# Patient Record
Sex: FEMALE | Race: WHITE | NOT HISPANIC OR LATINO | URBAN - METROPOLITAN AREA
[De-identification: names, ages, dates, MRNs, and addresses within clinical notes are randomized per-mention and may not be internally consistent; named-entity substitution may affect disease eponyms.]

---

## 2017-06-14 ENCOUNTER — OUTPATIENT (OUTPATIENT)
Dept: OUTPATIENT SERVICES | Facility: HOSPITAL | Age: 82
LOS: 1 days | Discharge: HOME | End: 2017-06-14

## 2017-06-14 DIAGNOSIS — I63.9 CEREBRAL INFARCTION, UNSPECIFIED: ICD-10-CM

## 2017-06-28 ENCOUNTER — OUTPATIENT (OUTPATIENT)
Dept: OUTPATIENT SERVICES | Facility: HOSPITAL | Age: 82
LOS: 1 days | Discharge: HOME | End: 2017-06-28

## 2017-06-28 DIAGNOSIS — I63.9 CEREBRAL INFARCTION, UNSPECIFIED: ICD-10-CM

## 2017-06-28 DIAGNOSIS — D68.9 COAGULATION DEFECT, UNSPECIFIED: ICD-10-CM

## 2017-07-12 ENCOUNTER — OUTPATIENT (OUTPATIENT)
Dept: OUTPATIENT SERVICES | Facility: HOSPITAL | Age: 82
LOS: 1 days | Discharge: HOME | End: 2017-07-12

## 2017-07-12 DIAGNOSIS — I63.9 CEREBRAL INFARCTION, UNSPECIFIED: ICD-10-CM

## 2017-07-12 DIAGNOSIS — D68.9 COAGULATION DEFECT, UNSPECIFIED: ICD-10-CM

## 2017-07-25 ENCOUNTER — OUTPATIENT (OUTPATIENT)
Dept: OUTPATIENT SERVICES | Facility: HOSPITAL | Age: 82
LOS: 1 days | Discharge: HOME | End: 2017-07-25

## 2017-07-25 DIAGNOSIS — I63.9 CEREBRAL INFARCTION, UNSPECIFIED: ICD-10-CM

## 2017-07-25 DIAGNOSIS — I48.2 CHRONIC ATRIAL FIBRILLATION: ICD-10-CM

## 2017-08-08 ENCOUNTER — OUTPATIENT (OUTPATIENT)
Dept: OUTPATIENT SERVICES | Facility: HOSPITAL | Age: 82
LOS: 1 days | Discharge: HOME | End: 2017-08-08

## 2017-08-08 DIAGNOSIS — I63.9 CEREBRAL INFARCTION, UNSPECIFIED: ICD-10-CM

## 2017-08-08 DIAGNOSIS — I48.2 CHRONIC ATRIAL FIBRILLATION: ICD-10-CM

## 2017-08-22 ENCOUNTER — OUTPATIENT (OUTPATIENT)
Dept: OUTPATIENT SERVICES | Facility: HOSPITAL | Age: 82
LOS: 1 days | Discharge: HOME | End: 2017-08-22

## 2017-08-22 DIAGNOSIS — I63.9 CEREBRAL INFARCTION, UNSPECIFIED: ICD-10-CM

## 2017-08-22 DIAGNOSIS — I48.2 CHRONIC ATRIAL FIBRILLATION: ICD-10-CM

## 2017-09-05 ENCOUNTER — OUTPATIENT (OUTPATIENT)
Dept: OUTPATIENT SERVICES | Facility: HOSPITAL | Age: 82
LOS: 1 days | Discharge: HOME | End: 2017-09-05

## 2017-09-05 DIAGNOSIS — I63.9 CEREBRAL INFARCTION, UNSPECIFIED: ICD-10-CM

## 2017-09-05 DIAGNOSIS — I48.2 CHRONIC ATRIAL FIBRILLATION: ICD-10-CM

## 2017-09-19 ENCOUNTER — OUTPATIENT (OUTPATIENT)
Dept: OUTPATIENT SERVICES | Facility: HOSPITAL | Age: 82
LOS: 1 days | Discharge: HOME | End: 2017-09-19

## 2017-09-19 DIAGNOSIS — I63.9 CEREBRAL INFARCTION, UNSPECIFIED: ICD-10-CM

## 2017-09-19 DIAGNOSIS — D68.9 COAGULATION DEFECT, UNSPECIFIED: ICD-10-CM

## 2017-10-03 ENCOUNTER — OUTPATIENT (OUTPATIENT)
Dept: OUTPATIENT SERVICES | Facility: HOSPITAL | Age: 82
LOS: 1 days | Discharge: HOME | End: 2017-10-03

## 2017-10-03 DIAGNOSIS — D68.9 COAGULATION DEFECT, UNSPECIFIED: ICD-10-CM

## 2017-10-03 DIAGNOSIS — I63.9 CEREBRAL INFARCTION, UNSPECIFIED: ICD-10-CM

## 2017-10-17 ENCOUNTER — OUTPATIENT (OUTPATIENT)
Dept: OUTPATIENT SERVICES | Facility: HOSPITAL | Age: 82
LOS: 1 days | Discharge: HOME | End: 2017-10-17

## 2017-10-17 DIAGNOSIS — D68.9 COAGULATION DEFECT, UNSPECIFIED: ICD-10-CM

## 2017-10-17 DIAGNOSIS — I63.9 CEREBRAL INFARCTION, UNSPECIFIED: ICD-10-CM

## 2017-10-31 ENCOUNTER — OUTPATIENT (OUTPATIENT)
Dept: OUTPATIENT SERVICES | Facility: HOSPITAL | Age: 82
LOS: 1 days | Discharge: HOME | End: 2017-10-31

## 2017-10-31 DIAGNOSIS — D68.9 COAGULATION DEFECT, UNSPECIFIED: ICD-10-CM

## 2017-10-31 DIAGNOSIS — I63.9 CEREBRAL INFARCTION, UNSPECIFIED: ICD-10-CM

## 2017-11-14 ENCOUNTER — OUTPATIENT (OUTPATIENT)
Dept: OUTPATIENT SERVICES | Facility: HOSPITAL | Age: 82
LOS: 1 days | Discharge: HOME | End: 2017-11-14

## 2017-11-14 DIAGNOSIS — I63.9 CEREBRAL INFARCTION, UNSPECIFIED: ICD-10-CM

## 2017-11-14 DIAGNOSIS — D68.9 COAGULATION DEFECT, UNSPECIFIED: ICD-10-CM

## 2017-11-28 ENCOUNTER — OUTPATIENT (OUTPATIENT)
Dept: OUTPATIENT SERVICES | Facility: HOSPITAL | Age: 82
LOS: 1 days | Discharge: HOME | End: 2017-11-28

## 2017-11-28 DIAGNOSIS — I63.9 CEREBRAL INFARCTION, UNSPECIFIED: ICD-10-CM

## 2017-11-28 DIAGNOSIS — D68.9 COAGULATION DEFECT, UNSPECIFIED: ICD-10-CM

## 2017-12-12 ENCOUNTER — OUTPATIENT (OUTPATIENT)
Dept: OUTPATIENT SERVICES | Facility: HOSPITAL | Age: 82
LOS: 1 days | Discharge: HOME | End: 2017-12-12

## 2017-12-12 DIAGNOSIS — D68.9 COAGULATION DEFECT, UNSPECIFIED: ICD-10-CM

## 2017-12-12 DIAGNOSIS — I63.9 CEREBRAL INFARCTION, UNSPECIFIED: ICD-10-CM

## 2017-12-17 ENCOUNTER — EMERGENCY (EMERGENCY)
Facility: HOSPITAL | Age: 82
LOS: 0 days | Discharge: HOME | End: 2017-12-17
Admitting: INTERNAL MEDICINE

## 2017-12-17 DIAGNOSIS — N39.0 URINARY TRACT INFECTION, SITE NOT SPECIFIED: ICD-10-CM

## 2017-12-17 DIAGNOSIS — I48.91 UNSPECIFIED ATRIAL FIBRILLATION: ICD-10-CM

## 2017-12-17 DIAGNOSIS — I63.9 CEREBRAL INFARCTION, UNSPECIFIED: ICD-10-CM

## 2017-12-17 DIAGNOSIS — I10 ESSENTIAL (PRIMARY) HYPERTENSION: ICD-10-CM

## 2017-12-17 DIAGNOSIS — R53.1 WEAKNESS: ICD-10-CM

## 2017-12-17 DIAGNOSIS — Z79.899 OTHER LONG TERM (CURRENT) DRUG THERAPY: ICD-10-CM

## 2017-12-17 DIAGNOSIS — M25.552 PAIN IN LEFT HIP: ICD-10-CM

## 2017-12-27 ENCOUNTER — OUTPATIENT (OUTPATIENT)
Dept: OUTPATIENT SERVICES | Facility: HOSPITAL | Age: 82
LOS: 1 days | Discharge: HOME | End: 2017-12-27

## 2017-12-27 DIAGNOSIS — D68.9 COAGULATION DEFECT, UNSPECIFIED: ICD-10-CM

## 2017-12-27 DIAGNOSIS — I63.9 CEREBRAL INFARCTION, UNSPECIFIED: ICD-10-CM

## 2018-01-10 ENCOUNTER — OUTPATIENT (OUTPATIENT)
Dept: OUTPATIENT SERVICES | Facility: HOSPITAL | Age: 83
LOS: 1 days | Discharge: HOME | End: 2018-01-10

## 2018-01-10 DIAGNOSIS — D68.9 COAGULATION DEFECT, UNSPECIFIED: ICD-10-CM

## 2018-01-10 DIAGNOSIS — I63.9 CEREBRAL INFARCTION, UNSPECIFIED: ICD-10-CM

## 2018-01-24 ENCOUNTER — OUTPATIENT (OUTPATIENT)
Dept: OUTPATIENT SERVICES | Facility: HOSPITAL | Age: 83
LOS: 1 days | Discharge: HOME | End: 2018-01-24

## 2018-01-24 DIAGNOSIS — D68.9 COAGULATION DEFECT, UNSPECIFIED: ICD-10-CM

## 2018-02-04 DIAGNOSIS — I63.9 CEREBRAL INFARCTION, UNSPECIFIED: ICD-10-CM

## 2018-02-06 ENCOUNTER — OUTPATIENT (OUTPATIENT)
Dept: OUTPATIENT SERVICES | Facility: HOSPITAL | Age: 83
LOS: 1 days | Discharge: HOME | End: 2018-02-06

## 2018-02-06 DIAGNOSIS — D68.9 COAGULATION DEFECT, UNSPECIFIED: ICD-10-CM

## 2018-02-20 ENCOUNTER — OUTPATIENT (OUTPATIENT)
Dept: OUTPATIENT SERVICES | Facility: HOSPITAL | Age: 83
LOS: 1 days | Discharge: HOME | End: 2018-02-20

## 2018-02-20 DIAGNOSIS — D68.9 COAGULATION DEFECT, UNSPECIFIED: ICD-10-CM

## 2018-03-06 ENCOUNTER — OUTPATIENT (OUTPATIENT)
Dept: OUTPATIENT SERVICES | Facility: HOSPITAL | Age: 83
LOS: 1 days | Discharge: HOME | End: 2018-03-06

## 2018-03-06 DIAGNOSIS — D68.9 COAGULATION DEFECT, UNSPECIFIED: ICD-10-CM

## 2018-03-20 ENCOUNTER — OUTPATIENT (OUTPATIENT)
Dept: OUTPATIENT SERVICES | Facility: HOSPITAL | Age: 83
LOS: 1 days | Discharge: HOME | End: 2018-03-20

## 2018-03-20 DIAGNOSIS — D68.9 COAGULATION DEFECT, UNSPECIFIED: ICD-10-CM

## 2018-04-03 ENCOUNTER — OUTPATIENT (OUTPATIENT)
Dept: OUTPATIENT SERVICES | Facility: HOSPITAL | Age: 83
LOS: 1 days | Discharge: HOME | End: 2018-04-03

## 2018-04-03 DIAGNOSIS — D68.9 COAGULATION DEFECT, UNSPECIFIED: ICD-10-CM

## 2018-04-18 ENCOUNTER — OUTPATIENT (OUTPATIENT)
Dept: OUTPATIENT SERVICES | Facility: HOSPITAL | Age: 83
LOS: 1 days | Discharge: HOME | End: 2018-04-18

## 2018-04-18 DIAGNOSIS — D68.9 COAGULATION DEFECT, UNSPECIFIED: ICD-10-CM

## 2018-05-02 ENCOUNTER — OUTPATIENT (OUTPATIENT)
Dept: OUTPATIENT SERVICES | Facility: HOSPITAL | Age: 83
LOS: 1 days | Discharge: HOME | End: 2018-05-02

## 2018-05-02 DIAGNOSIS — D68.9 COAGULATION DEFECT, UNSPECIFIED: ICD-10-CM

## 2018-05-16 ENCOUNTER — OUTPATIENT (OUTPATIENT)
Dept: OUTPATIENT SERVICES | Facility: HOSPITAL | Age: 83
LOS: 1 days | Discharge: HOME | End: 2018-05-16

## 2018-05-16 DIAGNOSIS — D68.9 COAGULATION DEFECT, UNSPECIFIED: ICD-10-CM

## 2018-05-30 ENCOUNTER — OUTPATIENT (OUTPATIENT)
Dept: OUTPATIENT SERVICES | Facility: HOSPITAL | Age: 83
LOS: 1 days | Discharge: HOME | End: 2018-05-30

## 2018-05-30 DIAGNOSIS — D68.9 COAGULATION DEFECT, UNSPECIFIED: ICD-10-CM

## 2018-06-13 ENCOUNTER — OUTPATIENT (OUTPATIENT)
Dept: OUTPATIENT SERVICES | Facility: HOSPITAL | Age: 83
LOS: 1 days | Discharge: HOME | End: 2018-06-13

## 2018-06-13 DIAGNOSIS — D68.9 COAGULATION DEFECT, UNSPECIFIED: ICD-10-CM

## 2018-06-27 ENCOUNTER — OUTPATIENT (OUTPATIENT)
Dept: OUTPATIENT SERVICES | Facility: HOSPITAL | Age: 83
LOS: 1 days | Discharge: HOME | End: 2018-06-27

## 2018-06-27 DIAGNOSIS — D68.9 COAGULATION DEFECT, UNSPECIFIED: ICD-10-CM

## 2018-07-11 ENCOUNTER — OUTPATIENT (OUTPATIENT)
Dept: OUTPATIENT SERVICES | Facility: HOSPITAL | Age: 83
LOS: 1 days | Discharge: HOME | End: 2018-07-11

## 2018-07-11 DIAGNOSIS — D68.9 COAGULATION DEFECT, UNSPECIFIED: ICD-10-CM

## 2018-07-25 ENCOUNTER — OUTPATIENT (OUTPATIENT)
Dept: OUTPATIENT SERVICES | Facility: HOSPITAL | Age: 83
LOS: 1 days | Discharge: HOME | End: 2018-07-25

## 2018-07-25 DIAGNOSIS — D68.9 COAGULATION DEFECT, UNSPECIFIED: ICD-10-CM

## 2018-08-15 ENCOUNTER — OUTPATIENT (OUTPATIENT)
Dept: OUTPATIENT SERVICES | Facility: HOSPITAL | Age: 83
LOS: 1 days | Discharge: HOME | End: 2018-08-15

## 2018-08-15 DIAGNOSIS — D68.9 COAGULATION DEFECT, UNSPECIFIED: ICD-10-CM

## 2018-09-05 ENCOUNTER — OUTPATIENT (OUTPATIENT)
Dept: OUTPATIENT SERVICES | Facility: HOSPITAL | Age: 83
LOS: 1 days | Discharge: HOME | End: 2018-09-05

## 2018-09-05 DIAGNOSIS — D68.9 COAGULATION DEFECT, UNSPECIFIED: ICD-10-CM

## 2018-09-14 ENCOUNTER — EMERGENCY (EMERGENCY)
Facility: HOSPITAL | Age: 83
LOS: 0 days | Discharge: LEFT BEFORE DISCHARGE | End: 2018-09-14
Attending: EMERGENCY MEDICINE | Admitting: EMERGENCY MEDICINE

## 2018-09-14 VITALS
SYSTOLIC BLOOD PRESSURE: 122 MMHG | HEART RATE: 90 BPM | OXYGEN SATURATION: 96 % | DIASTOLIC BLOOD PRESSURE: 56 MMHG | RESPIRATION RATE: 18 BRPM | TEMPERATURE: 98 F

## 2018-09-14 DIAGNOSIS — R07.89 OTHER CHEST PAIN: ICD-10-CM

## 2018-09-14 DIAGNOSIS — R55 SYNCOPE AND COLLAPSE: ICD-10-CM

## 2018-09-14 DIAGNOSIS — Z79.01 LONG TERM (CURRENT) USE OF ANTICOAGULANTS: ICD-10-CM

## 2018-09-14 DIAGNOSIS — E78.5 HYPERLIPIDEMIA, UNSPECIFIED: ICD-10-CM

## 2018-09-14 DIAGNOSIS — I10 ESSENTIAL (PRIMARY) HYPERTENSION: ICD-10-CM

## 2018-09-14 LAB
ALBUMIN SERPL ELPH-MCNC: 3.9 G/DL — SIGNIFICANT CHANGE UP (ref 3.5–5.2)
ALP SERPL-CCNC: 160 U/L — HIGH (ref 30–115)
ALT FLD-CCNC: 11 U/L — SIGNIFICANT CHANGE UP (ref 0–41)
ANION GAP SERPL CALC-SCNC: 15 MMOL/L — HIGH (ref 7–14)
APPEARANCE UR: CLEAR — SIGNIFICANT CHANGE UP
AST SERPL-CCNC: 30 U/L — SIGNIFICANT CHANGE UP (ref 0–41)
BASOPHILS # BLD AUTO: 0.02 K/UL — SIGNIFICANT CHANGE UP (ref 0–0.2)
BASOPHILS NFR BLD AUTO: 0.2 % — SIGNIFICANT CHANGE UP (ref 0–1)
BILIRUB SERPL-MCNC: 0.7 MG/DL — SIGNIFICANT CHANGE UP (ref 0.2–1.2)
BILIRUB UR-MCNC: NEGATIVE — SIGNIFICANT CHANGE UP
BUN SERPL-MCNC: 20 MG/DL — SIGNIFICANT CHANGE UP (ref 10–20)
CALCIUM SERPL-MCNC: 9.9 MG/DL — SIGNIFICANT CHANGE UP (ref 8.5–10.1)
CHLORIDE SERPL-SCNC: 104 MMOL/L — SIGNIFICANT CHANGE UP (ref 98–110)
CK SERPL-CCNC: 93 U/L — SIGNIFICANT CHANGE UP (ref 0–225)
CO2 SERPL-SCNC: 24 MMOL/L — SIGNIFICANT CHANGE UP (ref 17–32)
COLOR SPEC: YELLOW — SIGNIFICANT CHANGE UP
CREAT SERPL-MCNC: 1.1 MG/DL — SIGNIFICANT CHANGE UP (ref 0.7–1.5)
DIFF PNL FLD: ABNORMAL
EOSINOPHIL # BLD AUTO: 0 K/UL — SIGNIFICANT CHANGE UP (ref 0–0.7)
EOSINOPHIL NFR BLD AUTO: 0 % — SIGNIFICANT CHANGE UP (ref 0–8)
GLUCOSE SERPL-MCNC: 112 MG/DL — HIGH (ref 70–99)
GLUCOSE UR QL: NEGATIVE MG/DL — SIGNIFICANT CHANGE UP
HCT VFR BLD CALC: 41.7 % — SIGNIFICANT CHANGE UP (ref 37–47)
HGB BLD-MCNC: 14 G/DL — SIGNIFICANT CHANGE UP (ref 12–16)
IMM GRANULOCYTES NFR BLD AUTO: 0.3 % — SIGNIFICANT CHANGE UP (ref 0.1–0.3)
KETONES UR-MCNC: NEGATIVE — SIGNIFICANT CHANGE UP
LACTATE SERPL-SCNC: 1.9 MMOL/L — SIGNIFICANT CHANGE UP (ref 0.5–2.2)
LEUKOCYTE ESTERASE UR-ACNC: NEGATIVE — SIGNIFICANT CHANGE UP
LYMPHOCYTES # BLD AUTO: 0.83 K/UL — LOW (ref 1.2–3.4)
LYMPHOCYTES # BLD AUTO: 7 % — LOW (ref 20.5–51.1)
MCHC RBC-ENTMCNC: 33.6 G/DL — SIGNIFICANT CHANGE UP (ref 32–37)
MCHC RBC-ENTMCNC: 34.4 PG — HIGH (ref 27–31)
MCV RBC AUTO: 102.5 FL — HIGH (ref 81–99)
MONOCYTES # BLD AUTO: 0.5 K/UL — SIGNIFICANT CHANGE UP (ref 0.1–0.6)
MONOCYTES NFR BLD AUTO: 4.2 % — SIGNIFICANT CHANGE UP (ref 1.7–9.3)
NEUTROPHILS # BLD AUTO: 10.45 K/UL — HIGH (ref 1.4–6.5)
NEUTROPHILS NFR BLD AUTO: 88.3 % — HIGH (ref 42.2–75.2)
NITRITE UR-MCNC: NEGATIVE — SIGNIFICANT CHANGE UP
NRBC # BLD: 0 /100 WBCS — SIGNIFICANT CHANGE UP (ref 0–0)
PH UR: 6 — SIGNIFICANT CHANGE UP (ref 5–8)
PLATELET # BLD AUTO: 265 K/UL — SIGNIFICANT CHANGE UP (ref 130–400)
POTASSIUM SERPL-MCNC: 5.4 MMOL/L — HIGH (ref 3.5–5)
POTASSIUM SERPL-SCNC: 5.4 MMOL/L — HIGH (ref 3.5–5)
PROT SERPL-MCNC: 7.8 G/DL — SIGNIFICANT CHANGE UP (ref 6–8)
PROT UR-MCNC: NEGATIVE MG/DL — SIGNIFICANT CHANGE UP
RBC # BLD: 4.07 M/UL — LOW (ref 4.2–5.4)
RBC # FLD: 12.5 % — SIGNIFICANT CHANGE UP (ref 11.5–14.5)
RBC CASTS # UR COMP ASSIST: SIGNIFICANT CHANGE UP /HPF
SODIUM SERPL-SCNC: 143 MMOL/L — SIGNIFICANT CHANGE UP (ref 135–146)
SP GR SPEC: 1.01 — SIGNIFICANT CHANGE UP (ref 1.01–1.03)
TROPONIN T SERPL-MCNC: <0.01 NG/ML — SIGNIFICANT CHANGE UP
UROBILINOGEN FLD QL: 0.2 MG/DL — SIGNIFICANT CHANGE UP (ref 0.2–0.2)
WBC # BLD: 11.83 K/UL — HIGH (ref 4.8–10.8)
WBC # FLD AUTO: 11.83 K/UL — HIGH (ref 4.8–10.8)

## 2018-09-14 NOTE — ED PROVIDER NOTE - REFUSAL OF SERVICE, MDM
Discussed with health care proxy, who states that patient has had these episodes before and would like to take her home. Had lengthy discussion regarding our recommendation to admit her for syncope work up. She would still like to take the patient home and will sign her out AMA. Will give a copy of all her results so that patient follows up with PMD Dr. Nguyen. Discussed with health care proxy, Nedra Nguyen, who states that patient has had these episodes before and would like to take her home. Had lengthy discussion regarding our recommendation to admit her for syncope work up. She would still like to take the patient home and will sign her out AMA. Will give a copy of all her results so that patient follows up with PMD Dr. Nguyen.

## 2018-09-14 NOTE — ED PROVIDER NOTE - OBJECTIVE STATEMENT
87 yo F with hx of CVA with left sided deficit, HTN, HLD, dementia, afib on coumadin, presents for evaluation of witnessed syncopal episode, onset this morning, associated with chest tightness. No fever, no chills, no headache, no nausea, no vomiting, no diarrhea, no post ictal, no bowel or bladder incontinence, no back pain. Per aide, she was dressing patient, when patient slump over for a few seconds. At baseline patient is alert and oriented to person only, which she is at this time. Patient walks with walker at home. NO head trauma. No other symptoms reported

## 2018-09-14 NOTE — ED PROVIDER NOTE - PROGRESS NOTE DETAILS
ATTENDING NOTE: I personally evaluated the patient. I reviewed the Resident’s note (as assigned above), and agree with the findings and plan except as documented in my note.   85 y/o F with PMH of dementia, arthritis, stroke and a fib,  pt history is limited so history is given by aid. Today aid was trying to help pt move from the bathroom to the dinning table when pt started saying “I can’t” then proceed to have LOC and fall onto aid who help sit pt down on the toilet. Aid called 911 immediately. No head trauma. No fall onto floor. As per aid pt is currently has baseline in ED. No recent fevers. Physical exam: Pt non-toxic, well appearing. AAOx1, GCS 14, following simple commands, Pink conjunctiva, anicteric. PERRL. MMM, tongue midline  no exudates. Neck supple, no crepitus. RRR, no murmur. Lungs CTAB. Abdomen soft, NT/ND, no rash. No calf tenderness or edema. Moving all extremities.  No focal neuro deficits. Plan: Will get labs, imaging and reassess pending completion of w/u.  family requesting dc home. aware of risks. Discussed with health care proxy, who states that patient has had these episodes before and would like to take her home. Had lengthy discussion regarding our recommendation to admit her for syncope work up. She would still like to take the patient home and will sign her out AMA. Will give a copy of all her results so that patient follows up with PMD Dr. Nguyen. Discussed with health care proxy, Nedra Nguyen, who states that patient has had these episodes before and would like to take her home. Had lengthy discussion regarding our recommendation to admit her for syncope work up. She would still like to take the patient home and will sign her out AMA. Will give a copy of all her results so that patient follows up with PMD Dr. Nguyen.

## 2018-09-14 NOTE — ED PROVIDER NOTE - NEUROLOGICAL, MLM
Alert and oriented to person, CN II-XII intact, 4/5 R UE and LE strength, 3/5 L UE and LE strength, no sensory deficits.

## 2018-09-14 NOTE — ED PROVIDER NOTE - NEURO NEGATIVE STATEMENT, MLM
+ loss of consciousness, + syncope, no gait abnormality, no headache, no sensory deficits, and no weakness.

## 2018-09-14 NOTE — ED ADULT NURSE NOTE - NSIMPLEMENTINTERV_GEN_ALL_ED
Implemented All Fall Risk Interventions:  [place] to call system. Call bell, personal items and telephone within reach. Instruct patient to call for assistance. Room bathroom lighting operational. Non-slip footwear when patient is off stretcher. Physically safe environment: no spills, clutter or unnecessary equipment. Stretcher in lowest position, wheels locked, appropriate side rails in place. Provide visual cue, wrist band, yellow gown, etc. Monitor gait and stability. Monitor for mental status changes and reorient to person, place, and time. Review medications for side effects contributing to fall risk. Reinforce activity limits and safety measures with patient and family.

## 2018-09-16 LAB
CULTURE RESULTS: NO GROWTH — SIGNIFICANT CHANGE UP
SPECIMEN SOURCE: SIGNIFICANT CHANGE UP

## 2018-09-25 ENCOUNTER — OUTPATIENT (OUTPATIENT)
Dept: OUTPATIENT SERVICES | Facility: HOSPITAL | Age: 83
LOS: 1 days | Discharge: HOME | End: 2018-09-25

## 2018-09-25 DIAGNOSIS — D68.9 COAGULATION DEFECT, UNSPECIFIED: ICD-10-CM

## 2018-09-25 PROBLEM — I48.91 UNSPECIFIED ATRIAL FIBRILLATION: Chronic | Status: ACTIVE | Noted: 2018-09-14

## 2018-10-17 ENCOUNTER — OUTPATIENT (OUTPATIENT)
Dept: OUTPATIENT SERVICES | Facility: HOSPITAL | Age: 83
LOS: 1 days | Discharge: HOME | End: 2018-10-17

## 2018-10-17 DIAGNOSIS — D68.9 COAGULATION DEFECT, UNSPECIFIED: ICD-10-CM

## 2018-11-06 ENCOUNTER — OUTPATIENT (OUTPATIENT)
Dept: OUTPATIENT SERVICES | Facility: HOSPITAL | Age: 83
LOS: 1 days | Discharge: HOME | End: 2018-11-06

## 2018-11-06 DIAGNOSIS — D68.9 COAGULATION DEFECT, UNSPECIFIED: ICD-10-CM

## 2018-11-27 ENCOUNTER — OUTPATIENT (OUTPATIENT)
Dept: OUTPATIENT SERVICES | Facility: HOSPITAL | Age: 83
LOS: 1 days | Discharge: HOME | End: 2018-11-27

## 2018-11-27 DIAGNOSIS — D68.9 COAGULATION DEFECT, UNSPECIFIED: ICD-10-CM

## 2018-12-12 ENCOUNTER — OUTPATIENT (OUTPATIENT)
Dept: OUTPATIENT SERVICES | Facility: HOSPITAL | Age: 83
LOS: 1 days | Discharge: HOME | End: 2018-12-12

## 2018-12-12 DIAGNOSIS — D68.9 COAGULATION DEFECT, UNSPECIFIED: ICD-10-CM

## 2018-12-19 ENCOUNTER — OUTPATIENT (OUTPATIENT)
Dept: OUTPATIENT SERVICES | Facility: HOSPITAL | Age: 83
LOS: 1 days | Discharge: HOME | End: 2018-12-19

## 2018-12-19 DIAGNOSIS — Z02.9 ENCOUNTER FOR ADMINISTRATIVE EXAMINATIONS, UNSPECIFIED: ICD-10-CM

## 2019-01-09 ENCOUNTER — OUTPATIENT (OUTPATIENT)
Dept: OUTPATIENT SERVICES | Facility: HOSPITAL | Age: 84
LOS: 1 days | Discharge: HOME | End: 2019-01-09

## 2019-01-09 DIAGNOSIS — D68.9 COAGULATION DEFECT, UNSPECIFIED: ICD-10-CM

## 2019-01-30 ENCOUNTER — OUTPATIENT (OUTPATIENT)
Dept: OUTPATIENT SERVICES | Facility: HOSPITAL | Age: 84
LOS: 1 days | Discharge: HOME | End: 2019-01-30

## 2019-01-30 DIAGNOSIS — D68.9 COAGULATION DEFECT, UNSPECIFIED: ICD-10-CM

## 2019-02-13 ENCOUNTER — OUTPATIENT (OUTPATIENT)
Dept: OUTPATIENT SERVICES | Facility: HOSPITAL | Age: 84
LOS: 1 days | Discharge: HOME | End: 2019-02-13

## 2019-02-13 DIAGNOSIS — D68.9 COAGULATION DEFECT, UNSPECIFIED: ICD-10-CM

## 2019-03-01 ENCOUNTER — OUTPATIENT (OUTPATIENT)
Dept: OUTPATIENT SERVICES | Facility: HOSPITAL | Age: 84
LOS: 1 days | Discharge: HOME | End: 2019-03-01

## 2019-03-01 DIAGNOSIS — D68.9 COAGULATION DEFECT, UNSPECIFIED: ICD-10-CM

## 2019-03-12 ENCOUNTER — OUTPATIENT (OUTPATIENT)
Dept: OUTPATIENT SERVICES | Facility: HOSPITAL | Age: 84
LOS: 1 days | Discharge: HOME | End: 2019-03-12

## 2019-03-12 DIAGNOSIS — D68.9 COAGULATION DEFECT, UNSPECIFIED: ICD-10-CM

## 2019-03-27 ENCOUNTER — OUTPATIENT (OUTPATIENT)
Dept: OUTPATIENT SERVICES | Facility: HOSPITAL | Age: 84
LOS: 1 days | Discharge: HOME | End: 2019-03-27

## 2019-03-27 DIAGNOSIS — D68.9 COAGULATION DEFECT, UNSPECIFIED: ICD-10-CM

## 2019-04-09 ENCOUNTER — OUTPATIENT (OUTPATIENT)
Dept: OUTPATIENT SERVICES | Facility: HOSPITAL | Age: 84
LOS: 1 days | Discharge: HOME | End: 2019-04-09

## 2019-04-09 DIAGNOSIS — D68.9 COAGULATION DEFECT, UNSPECIFIED: ICD-10-CM

## 2019-04-23 ENCOUNTER — OUTPATIENT (OUTPATIENT)
Dept: OUTPATIENT SERVICES | Facility: HOSPITAL | Age: 84
LOS: 1 days | Discharge: HOME | End: 2019-04-23

## 2019-04-23 DIAGNOSIS — D68.9 COAGULATION DEFECT, UNSPECIFIED: ICD-10-CM

## 2019-05-07 ENCOUNTER — OUTPATIENT (OUTPATIENT)
Dept: OUTPATIENT SERVICES | Facility: HOSPITAL | Age: 84
LOS: 1 days | Discharge: HOME | End: 2019-05-07

## 2019-05-07 DIAGNOSIS — D68.9 COAGULATION DEFECT, UNSPECIFIED: ICD-10-CM

## 2019-05-21 ENCOUNTER — OUTPATIENT (OUTPATIENT)
Dept: OUTPATIENT SERVICES | Facility: HOSPITAL | Age: 84
LOS: 1 days | Discharge: HOME | End: 2019-05-21

## 2019-05-21 DIAGNOSIS — D68.9 COAGULATION DEFECT, UNSPECIFIED: ICD-10-CM

## 2019-05-22 ENCOUNTER — OUTPATIENT (OUTPATIENT)
Dept: OUTPATIENT SERVICES | Facility: HOSPITAL | Age: 84
LOS: 1 days | Discharge: HOME | End: 2019-05-22

## 2019-05-22 DIAGNOSIS — I48.91 UNSPECIFIED ATRIAL FIBRILLATION: ICD-10-CM

## 2019-06-04 ENCOUNTER — OUTPATIENT (OUTPATIENT)
Dept: OUTPATIENT SERVICES | Facility: HOSPITAL | Age: 84
LOS: 1 days | Discharge: HOME | End: 2019-06-04

## 2019-06-04 DIAGNOSIS — I48.91 UNSPECIFIED ATRIAL FIBRILLATION: ICD-10-CM

## 2019-06-18 ENCOUNTER — OUTPATIENT (OUTPATIENT)
Dept: OUTPATIENT SERVICES | Facility: HOSPITAL | Age: 84
LOS: 1 days | Discharge: HOME | End: 2019-06-18

## 2019-06-18 DIAGNOSIS — I48.91 UNSPECIFIED ATRIAL FIBRILLATION: ICD-10-CM

## 2019-06-19 ENCOUNTER — OUTPATIENT (OUTPATIENT)
Dept: OUTPATIENT SERVICES | Facility: HOSPITAL | Age: 84
LOS: 1 days | Discharge: HOME | End: 2019-06-19

## 2019-06-19 DIAGNOSIS — Z02.9 ENCOUNTER FOR ADMINISTRATIVE EXAMINATIONS, UNSPECIFIED: ICD-10-CM

## 2019-07-02 ENCOUNTER — OUTPATIENT (OUTPATIENT)
Dept: OUTPATIENT SERVICES | Facility: HOSPITAL | Age: 84
LOS: 1 days | Discharge: HOME | End: 2019-07-02

## 2019-07-02 DIAGNOSIS — I48.91 UNSPECIFIED ATRIAL FIBRILLATION: ICD-10-CM

## 2019-07-17 ENCOUNTER — OUTPATIENT (OUTPATIENT)
Dept: OUTPATIENT SERVICES | Facility: HOSPITAL | Age: 84
LOS: 1 days | Discharge: HOME | End: 2019-07-17

## 2019-07-17 DIAGNOSIS — I48.91 UNSPECIFIED ATRIAL FIBRILLATION: ICD-10-CM

## 2019-07-30 ENCOUNTER — OUTPATIENT (OUTPATIENT)
Dept: OUTPATIENT SERVICES | Facility: HOSPITAL | Age: 84
LOS: 1 days | Discharge: HOME | End: 2019-07-30

## 2019-07-30 DIAGNOSIS — I48.91 UNSPECIFIED ATRIAL FIBRILLATION: ICD-10-CM

## 2019-08-20 ENCOUNTER — OUTPATIENT (OUTPATIENT)
Dept: OUTPATIENT SERVICES | Facility: HOSPITAL | Age: 84
LOS: 1 days | Discharge: HOME | End: 2019-08-20

## 2019-08-20 DIAGNOSIS — D68.9 COAGULATION DEFECT, UNSPECIFIED: ICD-10-CM

## 2019-08-20 DIAGNOSIS — I48.2 CHRONIC ATRIAL FIBRILLATION: ICD-10-CM

## 2019-08-20 DIAGNOSIS — Z02.9 ENCOUNTER FOR ADMINISTRATIVE EXAMINATIONS, UNSPECIFIED: ICD-10-CM

## 2019-08-23 ENCOUNTER — OUTPATIENT (OUTPATIENT)
Dept: OUTPATIENT SERVICES | Facility: HOSPITAL | Age: 84
LOS: 1 days | Discharge: HOME | End: 2019-08-23

## 2019-08-23 DIAGNOSIS — D68.9 COAGULATION DEFECT, UNSPECIFIED: ICD-10-CM

## 2019-08-23 DIAGNOSIS — I48.2 CHRONIC ATRIAL FIBRILLATION: ICD-10-CM

## 2019-09-06 ENCOUNTER — OUTPATIENT (OUTPATIENT)
Dept: OUTPATIENT SERVICES | Facility: HOSPITAL | Age: 84
LOS: 1 days | Discharge: HOME | End: 2019-09-06

## 2019-09-06 DIAGNOSIS — I48.2 CHRONIC ATRIAL FIBRILLATION: ICD-10-CM

## 2019-09-06 DIAGNOSIS — D68.9 COAGULATION DEFECT, UNSPECIFIED: ICD-10-CM

## 2019-09-20 ENCOUNTER — OUTPATIENT (OUTPATIENT)
Dept: OUTPATIENT SERVICES | Facility: HOSPITAL | Age: 84
LOS: 1 days | Discharge: HOME | End: 2019-09-20

## 2019-09-20 DIAGNOSIS — D68.9 COAGULATION DEFECT, UNSPECIFIED: ICD-10-CM

## 2019-09-20 DIAGNOSIS — I48.2 CHRONIC ATRIAL FIBRILLATION: ICD-10-CM

## 2019-10-04 ENCOUNTER — OUTPATIENT (OUTPATIENT)
Dept: OUTPATIENT SERVICES | Facility: HOSPITAL | Age: 84
LOS: 1 days | Discharge: HOME | End: 2019-10-04

## 2019-10-04 DIAGNOSIS — R79.89 OTHER SPECIFIED ABNORMAL FINDINGS OF BLOOD CHEMISTRY: ICD-10-CM

## 2019-10-18 ENCOUNTER — OUTPATIENT (OUTPATIENT)
Dept: OUTPATIENT SERVICES | Facility: HOSPITAL | Age: 84
LOS: 1 days | Discharge: HOME | End: 2019-10-18

## 2019-10-18 DIAGNOSIS — R79.89 OTHER SPECIFIED ABNORMAL FINDINGS OF BLOOD CHEMISTRY: ICD-10-CM

## 2019-11-01 ENCOUNTER — OUTPATIENT (OUTPATIENT)
Dept: OUTPATIENT SERVICES | Facility: HOSPITAL | Age: 84
LOS: 1 days | Discharge: HOME | End: 2019-11-01

## 2019-11-01 DIAGNOSIS — R79.89 OTHER SPECIFIED ABNORMAL FINDINGS OF BLOOD CHEMISTRY: ICD-10-CM

## 2019-11-15 ENCOUNTER — OUTPATIENT (OUTPATIENT)
Dept: OUTPATIENT SERVICES | Facility: HOSPITAL | Age: 84
LOS: 1 days | Discharge: HOME | End: 2019-11-15

## 2019-11-15 DIAGNOSIS — R79.89 OTHER SPECIFIED ABNORMAL FINDINGS OF BLOOD CHEMISTRY: ICD-10-CM

## 2019-11-27 ENCOUNTER — OUTPATIENT (OUTPATIENT)
Dept: OUTPATIENT SERVICES | Facility: HOSPITAL | Age: 84
LOS: 1 days | Discharge: HOME | End: 2019-11-27

## 2019-11-29 DIAGNOSIS — D68.9 COAGULATION DEFECT, UNSPECIFIED: ICD-10-CM

## 2019-12-13 ENCOUNTER — OUTPATIENT (OUTPATIENT)
Dept: OUTPATIENT SERVICES | Facility: HOSPITAL | Age: 84
LOS: 1 days | Discharge: HOME | End: 2019-12-13

## 2019-12-17 DIAGNOSIS — I48.91 UNSPECIFIED ATRIAL FIBRILLATION: ICD-10-CM

## 2019-12-27 ENCOUNTER — OUTPATIENT (OUTPATIENT)
Dept: OUTPATIENT SERVICES | Facility: HOSPITAL | Age: 84
LOS: 1 days | Discharge: HOME | End: 2019-12-27

## 2019-12-27 DIAGNOSIS — R79.89 OTHER SPECIFIED ABNORMAL FINDINGS OF BLOOD CHEMISTRY: ICD-10-CM

## 2022-07-10 ENCOUNTER — INPATIENT (INPATIENT)
Facility: HOSPITAL | Age: 87
LOS: 3 days | Discharge: HOME HEALTH | End: 2022-07-14
Attending: STUDENT IN AN ORGANIZED HEALTH CARE EDUCATION/TRAINING PROGRAM | Admitting: STUDENT IN AN ORGANIZED HEALTH CARE EDUCATION/TRAINING PROGRAM

## 2022-07-10 ENCOUNTER — TRANSCRIPTION ENCOUNTER (OUTPATIENT)
Age: 87
End: 2022-07-10

## 2022-07-10 VITALS
TEMPERATURE: 96 F | DIASTOLIC BLOOD PRESSURE: 60 MMHG | HEART RATE: 55 BPM | OXYGEN SATURATION: 96 % | RESPIRATION RATE: 20 BRPM | SYSTOLIC BLOOD PRESSURE: 135 MMHG

## 2022-07-10 LAB
ALBUMIN SERPL ELPH-MCNC: 3.4 G/DL — LOW (ref 3.5–5.2)
ALP SERPL-CCNC: 271 U/L — HIGH (ref 30–115)
ALT FLD-CCNC: 19 U/L — SIGNIFICANT CHANGE UP (ref 0–41)
ANION GAP SERPL CALC-SCNC: 11 MMOL/L — SIGNIFICANT CHANGE UP (ref 7–14)
ANION GAP SERPL CALC-SCNC: 12 MMOL/L — SIGNIFICANT CHANGE UP (ref 7–14)
APPEARANCE UR: CLEAR — SIGNIFICANT CHANGE UP
APTT BLD: 52.1 SEC — HIGH (ref 27–39.2)
AST SERPL-CCNC: 27 U/L — SIGNIFICANT CHANGE UP (ref 0–41)
BACTERIA # UR AUTO: NEGATIVE — SIGNIFICANT CHANGE UP
BASOPHILS # BLD AUTO: 0 K/UL — SIGNIFICANT CHANGE UP (ref 0–0.2)
BASOPHILS NFR BLD AUTO: 0 % — SIGNIFICANT CHANGE UP (ref 0–1)
BILIRUB SERPL-MCNC: 0.5 MG/DL — SIGNIFICANT CHANGE UP (ref 0.2–1.2)
BILIRUB UR-MCNC: NEGATIVE — SIGNIFICANT CHANGE UP
BUN SERPL-MCNC: 39 MG/DL — HIGH (ref 10–20)
BUN SERPL-MCNC: 43 MG/DL — HIGH (ref 10–20)
CALCIUM SERPL-MCNC: 8.8 MG/DL — SIGNIFICANT CHANGE UP (ref 8.5–10.1)
CALCIUM SERPL-MCNC: 9 MG/DL — SIGNIFICANT CHANGE UP (ref 8.5–10.1)
CHLORIDE SERPL-SCNC: 101 MMOL/L — SIGNIFICANT CHANGE UP (ref 98–110)
CHLORIDE SERPL-SCNC: 97 MMOL/L — LOW (ref 98–110)
CO2 SERPL-SCNC: 20 MMOL/L — SIGNIFICANT CHANGE UP (ref 17–32)
CO2 SERPL-SCNC: 21 MMOL/L — SIGNIFICANT CHANGE UP (ref 17–32)
COLOR SPEC: COLORLESS — SIGNIFICANT CHANGE UP
CREAT SERPL-MCNC: 1.3 MG/DL — SIGNIFICANT CHANGE UP (ref 0.7–1.5)
CREAT SERPL-MCNC: 1.5 MG/DL — SIGNIFICANT CHANGE UP (ref 0.7–1.5)
CRP SERPL-MCNC: 37.2 MG/L — HIGH
DIFF PNL FLD: ABNORMAL
EGFR: 33 ML/MIN/1.73M2 — LOW
EGFR: 39 ML/MIN/1.73M2 — LOW
EOSINOPHIL # BLD AUTO: 0.07 K/UL — SIGNIFICANT CHANGE UP (ref 0–0.7)
EOSINOPHIL NFR BLD AUTO: 1.1 % — SIGNIFICANT CHANGE UP (ref 0–8)
EPI CELLS # UR: 1 /HPF — SIGNIFICANT CHANGE UP (ref 0–5)
ERYTHROCYTE [SEDIMENTATION RATE] IN BLOOD: 113 MM/HR — HIGH (ref 0–20)
GLUCOSE SERPL-MCNC: 74 MG/DL — SIGNIFICANT CHANGE UP (ref 70–99)
GLUCOSE SERPL-MCNC: 96 MG/DL — SIGNIFICANT CHANGE UP (ref 70–99)
GLUCOSE UR QL: NEGATIVE — SIGNIFICANT CHANGE UP
HCT VFR BLD CALC: 28.7 % — LOW (ref 37–47)
HGB BLD-MCNC: 9.5 G/DL — LOW (ref 12–16)
HYALINE CASTS # UR AUTO: 0 /LPF — SIGNIFICANT CHANGE UP (ref 0–7)
IMM GRANULOCYTES NFR BLD AUTO: 0.2 % — SIGNIFICANT CHANGE UP (ref 0.1–0.3)
INR BLD: 3.58 RATIO — HIGH (ref 0.65–1.3)
IRON SATN MFR SERPL: 16 % — SIGNIFICANT CHANGE UP (ref 15–50)
IRON SATN MFR SERPL: 54 UG/DL — SIGNIFICANT CHANGE UP (ref 35–150)
KETONES UR-MCNC: NEGATIVE — SIGNIFICANT CHANGE UP
LACTATE SERPL-SCNC: 0.5 MMOL/L — LOW (ref 0.7–2)
LEUKOCYTE ESTERASE UR-ACNC: NEGATIVE — SIGNIFICANT CHANGE UP
LYMPHOCYTES # BLD AUTO: 0.52 K/UL — LOW (ref 1.2–3.4)
LYMPHOCYTES # BLD AUTO: 8.4 % — LOW (ref 20.5–51.1)
MAGNESIUM SERPL-MCNC: 2.5 MG/DL — HIGH (ref 1.8–2.4)
MCHC RBC-ENTMCNC: 33.1 G/DL — SIGNIFICANT CHANGE UP (ref 32–37)
MCHC RBC-ENTMCNC: 34.7 PG — HIGH (ref 27–31)
MCV RBC AUTO: 104.7 FL — HIGH (ref 81–99)
MONOCYTES # BLD AUTO: 0.77 K/UL — HIGH (ref 0.1–0.6)
MONOCYTES NFR BLD AUTO: 12.4 % — HIGH (ref 1.7–9.3)
NEUTROPHILS # BLD AUTO: 4.85 K/UL — SIGNIFICANT CHANGE UP (ref 1.4–6.5)
NEUTROPHILS NFR BLD AUTO: 77.9 % — HIGH (ref 42.2–75.2)
NITRITE UR-MCNC: NEGATIVE — SIGNIFICANT CHANGE UP
NRBC # BLD: 0 /100 WBCS — SIGNIFICANT CHANGE UP (ref 0–0)
NT-PROBNP SERPL-SCNC: 1204 PG/ML — HIGH (ref 0–300)
OSMOLALITY SERPL: 290 MOS/KG — SIGNIFICANT CHANGE UP (ref 280–301)
OSMOLALITY UR: 295 MOS/KG — SIGNIFICANT CHANGE UP (ref 50–1200)
PH UR: 5 — SIGNIFICANT CHANGE UP (ref 5–8)
PLATELET # BLD AUTO: 195 K/UL — SIGNIFICANT CHANGE UP (ref 130–400)
POTASSIUM SERPL-MCNC: 5.1 MMOL/L — HIGH (ref 3.5–5)
POTASSIUM SERPL-MCNC: 5.5 MMOL/L — HIGH (ref 3.5–5)
POTASSIUM SERPL-SCNC: 5.1 MMOL/L — HIGH (ref 3.5–5)
POTASSIUM SERPL-SCNC: 5.5 MMOL/L — HIGH (ref 3.5–5)
PROT SERPL-MCNC: 6.7 G/DL — SIGNIFICANT CHANGE UP (ref 6–8)
PROT UR-MCNC: SIGNIFICANT CHANGE UP
PROTHROM AB SERPL-ACNC: >40 SEC — HIGH (ref 9.95–12.87)
RBC # BLD: 2.74 M/UL — LOW (ref 4.2–5.4)
RBC # FLD: 15.6 % — HIGH (ref 11.5–14.5)
RBC CASTS # UR COMP ASSIST: 3 /HPF — SIGNIFICANT CHANGE UP (ref 0–4)
SARS-COV-2 RNA SPEC QL NAA+PROBE: SIGNIFICANT CHANGE UP
SODIUM SERPL-SCNC: 128 MMOL/L — LOW (ref 135–146)
SODIUM SERPL-SCNC: 134 MMOL/L — LOW (ref 135–146)
SODIUM UR-SCNC: 73 MMOL/L — SIGNIFICANT CHANGE UP
SP GR SPEC: 1.01 — LOW (ref 1.01–1.03)
TIBC SERPL-MCNC: 334 UG/DL — SIGNIFICANT CHANGE UP (ref 220–430)
TROPONIN T SERPL-MCNC: 0.01 NG/ML — SIGNIFICANT CHANGE UP
TROPONIN T SERPL-MCNC: 0.02 NG/ML — HIGH
UIBC SERPL-MCNC: 280 UG/DL — SIGNIFICANT CHANGE UP (ref 110–370)
UROBILINOGEN FLD QL: SIGNIFICANT CHANGE UP
WBC # BLD: 6.22 K/UL — SIGNIFICANT CHANGE UP (ref 4.8–10.8)
WBC # FLD AUTO: 6.22 K/UL — SIGNIFICANT CHANGE UP (ref 4.8–10.8)
WBC UR QL: 1 /HPF — SIGNIFICANT CHANGE UP (ref 0–5)

## 2022-07-10 PROCEDURE — 99223 1ST HOSP IP/OBS HIGH 75: CPT

## 2022-07-10 PROCEDURE — 99232 SBSQ HOSP IP/OBS MODERATE 35: CPT | Mod: GC

## 2022-07-10 PROCEDURE — 71045 X-RAY EXAM CHEST 1 VIEW: CPT | Mod: 26

## 2022-07-10 PROCEDURE — 71045 X-RAY EXAM CHEST 1 VIEW: CPT | Mod: 26,77

## 2022-07-10 PROCEDURE — 93306 TTE W/DOPPLER COMPLETE: CPT | Mod: 26

## 2022-07-10 PROCEDURE — 73620 X-RAY EXAM OF FOOT: CPT | Mod: 26,RT

## 2022-07-10 PROCEDURE — 99285 EMERGENCY DEPT VISIT HI MDM: CPT | Mod: FS

## 2022-07-10 PROCEDURE — 93010 ELECTROCARDIOGRAM REPORT: CPT

## 2022-07-10 RX ORDER — ALBUTEROL 90 UG/1
2.5 AEROSOL, METERED ORAL EVERY 4 HOURS
Refills: 0 | Status: DISCONTINUED | OUTPATIENT
Start: 2022-07-10 | End: 2022-07-14

## 2022-07-10 RX ORDER — ALBUTEROL 90 UG/1
1.25 AEROSOL, METERED ORAL EVERY 6 HOURS
Refills: 0 | Status: DISCONTINUED | OUTPATIENT
Start: 2022-07-10 | End: 2022-07-10

## 2022-07-10 RX ORDER — ACETAMINOPHEN 500 MG
650 TABLET ORAL EVERY 6 HOURS
Refills: 0 | Status: DISCONTINUED | OUTPATIENT
Start: 2022-07-10 | End: 2022-07-14

## 2022-07-10 RX ORDER — FUROSEMIDE 40 MG
40 TABLET ORAL
Refills: 0 | Status: DISCONTINUED | OUTPATIENT
Start: 2022-07-10 | End: 2022-07-11

## 2022-07-10 RX ORDER — LANOLIN ALCOHOL/MO/W.PET/CERES
3 CREAM (GRAM) TOPICAL AT BEDTIME
Refills: 0 | Status: DISCONTINUED | OUTPATIENT
Start: 2022-07-10 | End: 2022-07-14

## 2022-07-10 RX ORDER — HYDRALAZINE HCL 50 MG
10 TABLET ORAL ONCE
Refills: 0 | Status: COMPLETED | OUTPATIENT
Start: 2022-07-10 | End: 2022-07-10

## 2022-07-10 RX ORDER — ONDANSETRON 8 MG/1
4 TABLET, FILM COATED ORAL EVERY 8 HOURS
Refills: 0 | Status: DISCONTINUED | OUTPATIENT
Start: 2022-07-10 | End: 2022-07-12

## 2022-07-10 RX ORDER — FUROSEMIDE 40 MG
20 TABLET ORAL ONCE
Refills: 0 | Status: COMPLETED | OUTPATIENT
Start: 2022-07-10 | End: 2022-07-10

## 2022-07-10 RX ORDER — AMLODIPINE BESYLATE 2.5 MG/1
5 TABLET ORAL ONCE
Refills: 0 | Status: DISCONTINUED | OUTPATIENT
Start: 2022-07-10 | End: 2022-07-11

## 2022-07-10 RX ADMIN — Medication 20 MILLIGRAM(S): at 04:09

## 2022-07-10 RX ADMIN — Medication 40 MILLIGRAM(S): at 17:14

## 2022-07-10 RX ADMIN — Medication 10 MILLIGRAM(S): at 14:41

## 2022-07-10 RX ADMIN — Medication 40 MILLIGRAM(S): at 11:08

## 2022-07-10 NOTE — SWALLOW BEDSIDE ASSESSMENT ADULT - SLP PERTINENT HISTORY OF CURRENT PROBLEM
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 (documented in outpatient note 6/2021, but niece/caregiver denies hx of HF). She presents with c/o SOB/Orthopnea.

## 2022-07-10 NOTE — H&P ADULT - NS ATTEND AMEND GEN_ALL_CORE FT
Patient seen and examined. Pertinent labs, imaging and telemetry reviewed. I agree with the above:    Patient is nonverbal and unable to participate in interview. Information pulled from chart and niece.   Patient has been having worsening dyspnea and orthopnea, along with LE edema.   Has had cough which improved with cough suppressant.   Found to have pulmonary edema on exam and CXR, along with elevated BNP.   -Possible previous history of HFpEF, but no documented TTE.   -Little response to Lasix 20mg IVP, though may be inaccurate due to incontinence.   Tele shows sinus bradycardia and EKG nonischemic.   -Mild troponemia which may be due to demand.   Possible acute on chronic HFpEF exacerbation.   -Lasix 40mg IVP BID.  -Check BMP/Mg BID with repletion of lytes.   -Hold AVNB agents due to bradycardia.   -Check TTE.   -Hyponatremia likely due to hypervolemia. Will send serum and urine osm for workup.   -Recheck BMP for K.   -If still elevated, will treat.   -Right foot ulcer. Podiatry recs appreciated.  -Will get wound care consult, US arterial duplex and XR to rule out OM.   -AF, currently in NSR.   -Check INR today and dose coumadin tonight.     Discussed with ACP.

## 2022-07-10 NOTE — ED PROVIDER NOTE - CLINICAL SUMMARY MEDICAL DECISION MAKING FREE TEXT BOX
89-year-old female history of prior CVA, hypertension dyslipidemia presenting for evaluation of shortness of breath, orthopnea with associated noted audible wheezing by family.  No other acute complaints.  Denies chest pain.  No history of CHF.  Chronically ill appearing, NAD, non toxic. NCAT PERRLA EOMI neck supple non tender normal wob while upright, diffuse Rales bilaterally abdomen s nt nd no rebound no guarding WWPx4 neuro non focal labs EKG imaging reviewed.  Patient diuresed.  Discussed with cardiology, will admit for further evaluation.

## 2022-07-10 NOTE — PATIENT PROFILE ADULT - FALL HARM RISK - HARM RISK INTERVENTIONS

## 2022-07-10 NOTE — DISCHARGE NOTE PROVIDER - NSDCMRMEDTOKEN_GEN_ALL_CORE_FT
atorvastatin 40 mg oral tablet: 1 tab(s) orally once a day  Calcium 600+D 600 mg-5 mcg (200 intl units) oral tablet: 1 tab(s) orally 3 times a day  Cardizem  mg/24 hours oral capsule, extended release: 1 cap(s) orally once a day  Protonix 40 mg oral delayed release tablet: 1 tab(s) orally once a day  warfarin 2 mg oral tablet: 1 tab(s) orally once a day   amLODIPine 2.5 mg oral tablet: 1 tab(s) orally once a day  Calcium 600+D 600 mg-5 mcg (200 intl units) oral tablet: 1 tab(s) orally 3 times a day  furosemide 20 mg oral tablet: 1 tab(s) orally once a day, As Needed -for shortness of breath   Protonix 40 mg oral delayed release tablet: 1 tab(s) orally once a day  warfarin 2 mg oral tablet: 1 tab(s) orally 3 times a week   amLODIPine 2.5 mg oral tablet: 1 tab(s) orally once a day  atorvastatin 40 mg oral tablet: 1 tab(s) orally once a day (at bedtime)  Calcium 600+D 600 mg-5 mcg (200 intl units) oral tablet: 1 tab(s) orally 3 times a day  furosemide 20 mg oral tablet: 1 tab(s) orally once a day, As Needed -for shortness of breath or decreased urination  Protonix 40 mg oral delayed release tablet: 1 tab(s) orally once a day  warfarin 2 mg oral tablet: 1 tab(s) orally 3 times a week

## 2022-07-10 NOTE — DISCHARGE NOTE PROVIDER - NSFOLLOWUPCLINICS_GEN_ALL_ED_FT
[facility]  Medicine  [address], [place]   Phone: [phone]  Fax:   Follow Up Time: Routine

## 2022-07-10 NOTE — ED PROVIDER NOTE - PHYSICAL EXAMINATION
Physical Exam    Vital Signs: I have reviewed the initial vital signs.  Constitutional: appears stated age, no acute distress  Eyes: Conjunctiva pink, Sclera clear  Cardiovascular: S1 and S2, regular rate, regular rhythm, well-perfused extremities, radial pulses equal and 2+ b/l.   Respiratory: unlabored respiratory effort, (+) rales in the bases b/l. no accessory muscle use.   Gastrointestinal: soft, non-tender, nondistended abdomen, no pulsatile mass, normal bowl sounds, no rebound, no guarding  Musculoskeletal: supple neck, (+) trace lower extremity edema left>right, no calf tenderness  Integumentary: warm, dry, no rash  Neurologic: pt is awake, nonverbal.   Psychiatric: appropriate mood, appropriate affect

## 2022-07-10 NOTE — DISCHARGE NOTE PROVIDER - NSDCCPCAREPLAN_GEN_ALL_CORE_FT
PRINCIPAL DISCHARGE DIAGNOSIS  Diagnosis: Fluid overload  Assessment and Plan of Treatment:       SECONDARY DISCHARGE DIAGNOSES  Diagnosis: Elevated troponin  Assessment and Plan of Treatment:      PRINCIPAL DISCHARGE DIAGNOSIS  Diagnosis: Acute on chronic diastolic congestive heart failure  Assessment and Plan of Treatment: furosemide ( Lasix 20mg) as needed, if you noticed increase in work of breathing, difficulty breathing and /or low urination      SECONDARY DISCHARGE DIAGNOSES  Diagnosis: Paroxysmal atrial fibrillation  Assessment and Plan of Treatment: on admission , it was noted that your heart rate was slower at rate of 40's. Cardizem was stopped.   Continue to take coumadin ( warfarin) 2mg 3 times per week, regular regime.   check PT/INR as directed by primary care doctor    Diagnosis: HTN (hypertension)  Assessment and Plan of Treatment: due to slow heart rate, cardizem was discontinue.   Start taking amlodipine 2.5mg daily  check and keep record of blood pressure atleast daily    Diagnosis: Foot ulcer  Assessment and Plan of Treatment: ulcer right foot  arterail ulcer     PRINCIPAL DISCHARGE DIAGNOSIS  Diagnosis: Acute on chronic diastolic congestive heart failure  Assessment and Plan of Treatment: You had excess fluid in the lungs and you are being sent home with a water pill. Please taken furosemide/Lasix 20mg as needed, if you notice increase in work of breathing, difficulty breathing and /or low urination.      SECONDARY DISCHARGE DIAGNOSES  Diagnosis: Paroxysmal atrial fibrillation  Assessment and Plan of Treatment: When you came to the hospital, your heart rate was very slow and so your diltiazem/Cardizem was stopped. You should continue to take to warfarin/Coumadin 2mg three times per week. Your home services should check your INR.    Diagnosis: HTN (hypertension)  Assessment and Plan of Treatment: Start taking amlodipine 2.5mg daily. Check and keep record of your heart rate and blood pressure each day, please write your numbers down and bring to the doctor's office.    Diagnosis: Foot ulcer  Assessment and Plan of Treatment: You should see your Podiatrist when you leave the hospital.

## 2022-07-10 NOTE — ED ADULT TRIAGE NOTE - MODE OF ARRIVAL
Nursing Note by Mally Diaz, RN at 02/05/18 04:56 PM     Author:  Mally Diaz RN Service:  (none) Author Type:  Registered Nurse     Filed:  02/05/18 04:56 PM Encounter Date:  1/30/2018 Status:  Signed     :  Mally Diaz RN (Registered Nurse)            Sent pt messg to see if avail to do injection on 2/27.     Awaiting her response.[JW1.1M]        Revision History        User Key Date/Time User Provider Type Action    > JW1.1 02/05/18 04:56 PM Mally Diaz, RN Registered Nurse Sign    M - Manual             [facility]

## 2022-07-10 NOTE — PATIENT PROFILE ADULT - NSTRANSFERBELONGINGSRESP_GEN_A_NUR
Date of Service: 12/29/2017    SUBJECTIVE:  The patient is coming in for followup regarding multiple medical problems including hypertension, type 2 diabetes, morbid obesity, bilateral leg edema, hypothyroidism.  [facility].  The patient did have a hemoglobin A1c of 6.6. Anemia noted with hemoglobin of 11.7.  TSH of 1.77.  Potassium 5.3.  The patient notes that he has been having epigastric pain, particularly with walking.  His gallbladder is out.  He notes no pain with eating.  He has not had any black or bloody stools.  The patient denies any nausea or vomiting.  The patient denies any shortness of breath or chest pain with this.  The patient has noted his blood sugars are running about 130.  He has not had any problems with dizziness or lightheadedness.  He denies any problems with palpitations.  He denies any muscle aches.    PHYSICAL EXAMINATION:  VITAL SIGNS:  Blood pressure is 124/64, pulse of 60, respirations 16.  GENERAL:  A pleasant white male in no acute distress.  HEENT:  Oropharynx is pink and moist.  NECK:  Supple with no palpable adenopathy, thyromegaly, or notable JVD.  LUNGS:  Clear to auscultation bilaterally with good expansion.  He has normal respiratory effort.  HEART:  Regular rate and rhythm but is distant to auscultation.  ABDOMEN:  Obese, soft and nontender.  There is no epigastric tenderness that is palpated.  EXTREMITIES:  Have 2+ pitting edema bilaterally.  SKIN:  Warm and dry.  NEUROLOGIC:  Mental status is intact to person, place and time.    ASSESSMENT:  1.  Epigastric pain.  He does not seem to be tender on examination.  He is mildly anemic.  With this I am going to put him on Omeprazole 20 mg daily.  I am concerned the epigastric pain potentially might be an anginal equivalent.  If there is no improvement in one month, consideration would be to do a stress test since he does note some exertional component to this.  2.  Type 2  diabetes, which is under good control.  Hemoglobin A1c was just checked which is 6.6.  3.  Morbid obesity.  The patient needs to lose weight.  At this point, I do not think that is a likely scenario based on his age.  4.  Hypertension, which is under good control.  His metabolic panel is good.  His potassium is mildly elevated, but I think it is related to the Losartan.  5.  Bilateral leg edema.  I do not think the patient is able to put on compression stockings due to his body habitus.  With this, would continue with the Furosemide at least for right now.  His echocardiogram looked good.  There was no kidney dysfunction that was seen.  6.  Hypothyroidism, which is euthyroid.  The patient's TSH is normal.  7.  Anemia, which is mild.  Will put him on Omeprazole 20 mg daily and recheck this in a couple months.  If this is still a problem, then I do think we may need to investigate this further.  I do think it might be reflux disease correlating with the epigastric discomfort.  I want the patient to call in 1 month to let me know how he is feeling with the epigastric discomfort.  If there is no improvement, consideration would be to do a stress test.      Dictated By: Varinder Castillo MD  Signing Provider: Varinder Castillo MD    RL/PC ([id number])  DD: 12/29/2017 10:45:24 TD: 12/29/2017 13:16:34    Copy Sent To:    yes

## 2022-07-10 NOTE — H&P ADULT - NSICDXPASTMEDICALHX_GEN_ALL_CORE_FT
PAST MEDICAL HISTORY:  Afib     Chronic atrial fibrillation     CVA (cerebrovascular accident)     HLD (hyperlipidemia)     HTN (hypertension)

## 2022-07-10 NOTE — ED ADULT NURSE NOTE - CAS EDP DISCH DISPOSITION ADMI
Telemetry Purse String (Simple) Text: Given the location of the defect and the characteristics of the surrounding skin a pursestring closure was deemed most appropriate.  Undermining was performed circumfirentially around the surgical defect.  A purstring suture was then placed and tightened.

## 2022-07-10 NOTE — H&P ADULT - ASSESSMENT
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 (documented in outpatient note 6/2021, but niece/caregiver denies hx of HF). She presents with c/o SOB/Orthopnea.     IMPRESSION/PLAN    SOB likely related to acute on chronic HF  BNP- 1204, Trop 0.02  -obtain TTE   - lasix 20mg IV x1 given   - monitor electrolytes MG>2, K>4  -repeat chest xray   - obtain procalcitonin with AM lab  - trend troponin    Electrolyte imbalance  Hyponatremia-   Hyperkalemia--k-5.5  - Obtain urine osmolality, urine NA  - repeat BMP at 11am lab draw    Chronic AFIB- last documented afib on ekg 2018  - daily dose of warfarin  -daily pt/inr  - currently SB 43 bpm- HOLD Cardizem 180mg     HTN  - VS Per routine  -     Foot ulcer- arterial ulcer vs pressure ulcer  -podiatry consult   -     HX CVA with left sided hemiparesis  - PT eval   - NPO pending speech and swallow eval      CODE STATUS: DNR/DNI    Patient is a 89 y/o female with history of chronic afib ( last documented afib on ekg 2018, on warfarin) , HTN, HLD, 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 (documented in outpatient note 6/2021, but niece/caregiver denies hx of HF). She presents with c/o SOB/Orthopnea.     IMPRESSION/PLAN    SOB/orthopnea likely related to acute on chronic HF  BNP- 1204, Trop 0.02  -obtain TTE   - lasix 20mg IV x1 given   - monitor electrolytes MG>2, K>4  -repeat chest xray   - obtain procalcitonin with AM lab, trend troponin, iron serum, ferritin       Electrolyte imbalance  Hyponatremia-   Hyperkalemia--k-5.5  - Obtain urine osmolality, urine NA  - repeat BMP at 11am lab draw      Chronic AFIB-(last documented afib on ekg 2018)  - currently SB 43 bpm- HOLD Cardizem 180mg   - daily dose of warfarin  - daily pt/inr, check TSH     HTN  - VS Per routine  -     Foot ulcer- arterial ulcer vs pressure ulcer  -podiatry consult   - obtain ESR/ CRP   - obtain arterial doppler    HX CVA with left sided hemiparesis  - PT eval   - NPO pending speech and swallow eval      CODE STATUS: DNR/DNI    Patient is a 89 y/o female with history of chronic afib ( last documented afib on ekg 2018, on warfarin) , HTN, HLD, 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 (documented in outpatient note 6/2021, but niece/caregiver denies hx of HF). She presents with c/o SOB/Orthopnea and leg edema. Brought in by caregiver ( Nedra).      IMPRESSION/PLAN    SOB/orthopnea likely related to acute on chronic HF  BNP- 1204, Trop 0.02, CXR-  pulm. congestion   -obtain TTE   - lasix 20mg IV x1 given at 4am   - monitor electrolytes MG>2, K>4  -repeat chest xray   - obtain procalcitonin with AM lab, trend troponin, iron serum, ferritin   -titrate supplemental O2 to >92%  -strict I&O, daily weights, elevate b/l LE     Electrolyte imbalance  Hyponatremia-   Hyperkalemia--k-5.5  - Obtain urine osmolality, urine NA  - repeat BMP at 11am lab draw      Chronic AFIB-(last documented afib on ekg 2018)  - currently on EKG - SB with first degree AVB, 43 bpm- HOLD Cardizem 180mg   - daily dose of warfarin  - daily pt/inr, check TSH     HTN  - VS Per routine  - cardizem on HOLD, SB 43bpm   -Consider adding ACEI/ARB based on repeat BMP and subsequent BP reading       Right Foot ulcer- arterial ulcer /pressure ulcer  -podiatry consult /wound consult  - obtain ESR/ CRP   - possibly obtain arterial doppler    HX CVA with left sided hemiparesis  - PT eval   - NPO pending speech and swallow eval    HLD  - Cont atorvastatin 40mg    CODE STATUS: DNR/DNI

## 2022-07-10 NOTE — ED PROVIDER NOTE - NS ED ATTENDING STATEMENT MOD
This was a shared visit with the MED. I reviewed and verified the documentation and independently performed the documented:

## 2022-07-10 NOTE — CONSULT NOTE ADULT - SUBJECTIVE AND OBJECTIVE BOX
Podiatry Consult Note    Subjective:  PRAVEEN LOPEZ  Seen Bedside 89y Female  .   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 (documented in outpatient note 6/2021, but niece/caregiver denies hx of HF). She presents with c/o SOB/Orthopnea.   History was obtained from niece, ramakrishna who is the caregiver, patient unable to communicate due to hx CVA residual of aphasia. Wheel chair bound. Lives with niece and has home health aide.   Caregiver states onset of symptom was about a week ago which started with cough and congestion, cough resolved with OTC Robitussin. However, caregiver  noticed increased in breathing effort and wheeze. SOB is aggravated with laying flat, therefore decided to bring in to hospital for further evaluation.  Other symptoms include b/l leg edema. Patient does not have a cardiologist.  Other complains include right foot wound/ulcer  behind hallux, heel and eschar on top of second toe, which is managed by  home care podiatrist Dr. Vega. Took azithromycin 250mg back in may 2022 for possible wound infection in the foot.   Denies recent respiratory infection, change in diet. Denies fever.  Patient unable to communicate of  any other symptoms such as Chest pain, palpitation, dysuria.   caregiver denies any recent diagnostic cardiac studies.   In ED, 1365/60, HR 55, TEMP-36.0 celsius, 96% on 3l NC.  EKG shows RBBB, SB with first degree AVB. BNP 1204, Troponin 0.02. CXR- shows some pulm. congestion    Patient will be admitted to cardiology telemetry for further workup for acute on chronic HF  (10 Jul 2022 04:54)      Past Medical History and Surgical History  PAST MEDICAL & SURGICAL HISTORY:  Afib      Chronic atrial fibrillation      CVA (cerebrovascular accident)      HTN (hypertension)      HLD (hyperlipidemia)           Review of Systems:  [X] Ten point review of systems is otherwise negative except as noted     Objective:  Vital Signs Last 24 Hrs  T(C): 35.6 (10 Jul 2022 00:29), Max: 35.6 (10 Jul 2022 00:29)  T(F): 96 (10 Jul 2022 00:29), Max: 96 (10 Jul 2022 00:29)  HR: 42 (10 Jul 2022 05:25) (42 - 55)  BP: 169/93 (10 Jul 2022 05:25) (135/60 - 169/93)  BP(mean): --  RR: 17 (10 Jul 2022 06:21) (17 - 20)  SpO2: 96% (10 Jul 2022 06:21) (94% - 96%)    Parameters below as of 10 Jul 2022 06:21  Patient On (Oxygen Delivery Method): nasal cannula  O2 Flow (L/min): 2                          9.5    6.22  )-----------( 195      ( 10 Jul 2022 01:20 )             28.7                 07-10    128<L>  |  97<L>  |  43<H>  ----------------------------<  96  5.5<H>   |  20  |  1.5    Ca    9.0      10 Jul 2022 01:20    TPro  6.7  /  Alb  3.4<L>  /  TBili  0.5  /  DBili  x   /  AST  27  /  ALT  19  /  AlkPhos  271<H>  07-10        Physical Exam - Lower Extremity Focused:   Derm:      Right foot dry eschar dorsal hallux and 1st mtpj, no purulent drainage, no malodor no probe to bone.   Vascular: DP and PT Pulses Diminished; Foot is Warm to Warm to the touch; Capillary Refill Time < 3 Seconds;    Neuro: Protective Sensation Diminished / Moderately Neuropathic   MSK: Pain On Palpation at Wound Site     Assessment:  Right Forefoot dry eschar    Plan:  Chart reviewed and Patient evaluated. All Questions and Concerns Addressed and Answered  XR Imaging  Foot; Pending Results  Local Wound Care; Betadine /Gauze / DSD / Kerlix   Weight Bearing Status; WBAT w/ Heel Touch w/ Surgical Shoe;   Recommend; Lower Extremity Arterial Duplex B/L; ESR/CRP;       No podiatric surgical intervention indicated at this time.   [facility] [address] [facility] dsc.   Discussed Plan w/     Podiatry

## 2022-07-10 NOTE — DISCHARGE NOTE PROVIDER - NSDCFUADDINST_GEN_ALL_CORE_FT
-due to slow heart rate, STOP taking cardizem   -STOP taking atorvastatin , based on age not needed at this time.   -For high blood pressure, Start taking amlodipine 2.5mg daily, check and keep record of blood pressure atleast daily  - Start taking furosemide ( Lasix 20mg) as needed once daily, if you notice increase in work of breathing, difficulty breathing and /or low urination  - Continue to followup with your podiatry for right foot ulcer/wound care and visiting nursing was set up for home health care  -Home Care Agency  [facility]  [phone]  SKILLED NURSE, Physical Therapy, Home health aide

## 2022-07-10 NOTE — DISCHARGE NOTE NURSING/CASE MANAGEMENT/SOCIAL WORK - PATIENT PORTAL LINK FT
You can access the FollowMyHealth Patient Portal offered by [facility] by registering at the following website: [email or web address]. By joining [facility]’s FollowMyHealth portal, you will also be able to view your health information using other applications (apps) compatible with our system.

## 2022-07-10 NOTE — DISCHARGE NOTE NURSING/CASE MANAGEMENT/SOCIAL WORK - NSDCPEFALRISK_GEN_ALL_CORE
For information on Fall & Injury Prevention, visit: [email or web address] OR  [email or web address] OR  https://www.cdc.gov/steadi/patient.html

## 2022-07-10 NOTE — SWALLOW BEDSIDE ASSESSMENT ADULT - COMMENTS
pt received awake. +NC. +aphasic. pt w/ tachypnea and audible wheeze baseline. pt is currently not a candidate for PO diet  2' high risk of aspiration.

## 2022-07-10 NOTE — ED PROVIDER NOTE - OBJECTIVE STATEMENT
89-year-old female with a past medical history of multiple CVA nonverbal, hypertension, hyperlipidemia, A. fib on Coumadin, and dementia presents to the ED for evaluation of orthopnea x1 week.  As per patient's niece at bedside patient has had difficulty breathing while lying down at night that has not improved over the past week and worsened this evening.  Patient's breathing improves while she is sitting up.  Patient does not have a cardiologist.  As per niece patient has not been walking as much as she used to recently.  Patient is nonverbal and cannot respond to questions.  As per patient's niece patient is at her baseline.

## 2022-07-10 NOTE — SWALLOW BEDSIDE ASSESSMENT ADULT - ORAL PREPARATORY PHASE
Refuses to accept bolus into oral cavity/Reduced oral grading/Decreased mastication ability/Bolus falls into anterior sulcus

## 2022-07-10 NOTE — PHYSICAL THERAPY INITIAL EVALUATION ADULT - SPECIFY REASON(S)
Per chart pt is non verbal, w/c bound, non ambulatory with HHA 12hr/day prior to ADM.  Pt may return home with assist & services.

## 2022-07-10 NOTE — DISCHARGE NOTE PROVIDER - HOSPITAL COURSE
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 and transitioned to oral lasix. TTE 7/10/22 Showed normal LV function with Mod MR and Mod AS.  Patient was having difficulty swallowing so speech and swallow eval was performed and patient failed. Patient S+S eval was repeated the following day and again failed. Goals of care conversation was held with patients niece who lives with her and decision was made for comort feeds and a puree diet but no NGT placement or PEG.    She was also seen by podiatry for non healing right foot wounds. Wound care was performed by podiatry. 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 and transitioned to oral lasix. TTE 7/10/22 Showed normal LV function with Mod MR and Mod AS.  Patient was having difficulty swallowing so speech and swallow eval was performed and patient failed. Patient S+S eval was repeated the following day and again failed. Goals of care conversation was held with patients niece who lives with her and decision was made for comort feeds and a puree diet but no NGT placement or PEG.     She was also seen by podiatry for non healing right foot wounds. Wound care was performed by podiatry. Procal was normal andXray was negative for OM    During hospitalization patient did have symptomatic bradycardia and therefore cardizem was changed to amlodipine.     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 Transitioned to oral lasix and weaned off supplemental oxygen. TTE 7/10/22 Showed normal LV function with Mod MR and Mod AS.  Patient with low ferritin ( 87), completed 4 days course of IV iron.   Patient was having difficulty swallowing so speech and swallow eval was performed and patient failed. Patient S+S eval was repeated the following day and again failed. Goals of care conversation was held with patients niece who lives with her and decision was made for comort feeds and a puree diet but no NGT placement or PEG.     She was also seen by podiatry for non healing right foot wounds. Wound care was performed by podiatry. Xray was negative for OM.    During hospitalization, on telemetry monitor pt was noted to be in persistent Sinus bradycardia and therefore Cardizem was discontinued. Now in atrial fibrillation , rate controlled <110 bpm. To continue home regime of coumadin 2mg 3x/week.  For HTN management, amlodipine 2.5mg was started and tolerated.   Patient is stable to be discharge home with home health visiting Nurse.    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 Transitioned to oral lasix and weaned off supplemental oxygen. TTE 7/10/22 Showed normal LV function with Mod MR and Mod AS.  Patient with low ferritin ( 87), completed 4 days course of IV iron.   Patient was having difficulty swallowing so speech and swallow eval was performed and patient failed. Patient S+S eval was repeated the following day and again failed. Goals of care conversation was held with patients niece who lives with her and decision was made for comort feeds and a puree diet but no NGT placement or PEG.     She was also seen by podiatry for non healing right foot wounds. Wound care was performed by podiatry. Xray was negative for OM.    On admission, pt was noted to be in persistent Sinus bradycardia on telemetry monitor and therefore Cardizem was discontinued.   During hospitalization, patient now in atrial fibrillation , rate controlled <110 bpm. To continue home regime of coumadin 2mg 3x/week.  For HTN management, amlodipine 2.5mg was started and tolerated.   Patient is stable to be discharge home with home health visiting Nurse.    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 ED with SOB and orthopnea found to be volume overloaded and admitted for HFpEF and was diuresed until euvolemic. Transitioned to oral lasix and weaned off supplemental oxygen. TTE 7/10/22 Showed normal LV function with Mod MR and Mod AS.  Patient with low ferritin (87), completed 4 days course of IV iron.   Patient was having difficulty swallowing so speech and swallow eval was performed and patient failed. Patient S+S eval was repeated the following day and again failed. Goals of care conversation was held with patients niece who lives with her and decision was made for comort feeds and a puree diet but no NGT placement or PEG.     She was also seen by podiatry for non healing right foot wounds. Wound care was performed by podiatry. Xray was negative for OM.    On admission, pt was noted to be in persistent Sinus bradycardia on telemetry monitor and therefore Cardizem was discontinued. During hospitalization, patient now in atrial fibrillation, rate controlled <110 bpm without AV jose agent.     To continue home regime of coumadin 2mg 3x/week.  For HTN management, amlodipine 2.5mg was started and tolerated.   Patient is stable to be discharge home with home health visiting Nurse.

## 2022-07-10 NOTE — CONSULT NOTE ADULT - ATTENDING COMMENTS
agree with above note   no surgical intervention   discussed with patient and resident  follow up when D/C

## 2022-07-10 NOTE — ED ADULT TRIAGE NOTE - CHIEF COMPLAINT QUOTE
Pt presents to ED c/o SOB when laying down. Pt has hx of stroke with R sided residul weakness. Pt placed on 3L NC sat 97%   88% RA

## 2022-07-10 NOTE — H&P ADULT - NSHPLABSRESULTS_GEN_ALL_CORE
LABS:                        9.5    6.22  )-----------( 195      ( 10 Jul 2022 01:20 )             28.7   07-10    128<L>  |  97<L>  |  43<H>  ----------------------------<  96  5.5<H>   |  20  |  1.5    Ca    9.0      10 Jul 2022 01:20    TPro  6.7  /  Alb  3.4<L>  /  TBili  0.5  /  DBili  x   /  AST  27  /  ALT  19  /  AlkPhos  271<H>  07-10 LABS:                        9.5    6.22  )-----------( 195      ( 10 Jul 2022 01:20 )             28.7   07-10    128<L>  |  97<L>  |  43<H>  ----------------------------<  96  5.5<H>   |  20  |  1.5    Ca    9.0      10 Jul 2022 01:20    TPro  6.7  /  Alb  3.4<L>  /  TBili  0.5  /  DBili  x   /  AST  27  /  ALT  19  /  AlkPhos  271<H>  07-10      RADIOLOGY:  CXR pending

## 2022-07-10 NOTE — PHYSICAL THERAPY INITIAL EVALUATION ADULT - RANGE OF MOTION EXAMINATION, REHAB EVAL
unable to fully assess pt grimacing with discomfort & moaning when therapist attempted movement. Pt relaxes when movement is stopped. Covering RN aware. Cardio NP aware.

## 2022-07-10 NOTE — H&P ADULT - NSHPPHYSICALEXAM_GEN_ALL_CORE
General: No apparent distress, seating in semi azul position  HEENT: Anicteric sclera. Moist mucous membranes.   Cardiac: Regular rate and rhythm. No rubs, or gallops.   Vascular: Symmetric radial pulses. Dorsalis pedis pulses palpable.   Respiratory: Normal effort. coarse crackles b/l , expiratory wheeze   Abdomen: Soft, nontender. Audible bowel sounds.   Extremities: Warm with b/l LE +2 pitting edema . No cyanosis or clubbing.   Skin: right foot ulcer , Warm and dry. No rash.   Neurologic: Grossly normal motor function.   Psychiatric: awake , unable to communicate

## 2022-07-10 NOTE — ED ADULT NURSE NOTE - NS ED NURSE LEVEL OF CONSCIOUSNESS ORIENTATION
Oriented - self; Oriented - place; Oriented - time [Negative] : Heme/Lymph [FreeTextEntry3] : last optho - 2 years

## 2022-07-10 NOTE — PHYSICAL THERAPY INITIAL EVALUATION ADULT - GENERAL OBSERVATIONS, REHAB EVAL
14:04-14:25 Pt encountered in bed non verbal (2* aphasia from history of CVA) with tele, IV lock, O2 NC, prima fit, in NAD. Pt left same as found with tele, IV lock, prima fit, O2 NC, in NAD. Covering HARJIT rodriguez.

## 2022-07-10 NOTE — DISCHARGE NOTE PROVIDER - ATTENDING DISCHARGE PHYSICAL EXAMINATION:
Patient is awake and responsive. Still with decreased breath sounds and crackles at the bases, but no oxygen requirement or difficulty breathing. I spoke to the patient's niece Nedra and reviewed all medication changes.

## 2022-07-10 NOTE — H&P ADULT - HISTORY OF PRESENT ILLNESS
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 (documented in outpatient note 6/2021, but niece/caregiver denies hx of HF). She presents with c/o SOB/Orthopnea.   History was obtained from niece, ramakrishna who is the caregiver, patient unable to communicate due to hx CVA residual of aphasia. Wheel chair bound. Lives with niece and has home health aide.   Caregiver states onset of symptom was about a week ago which started with cough and congestion, cough resolved with OTC Robitussin. However, caregiver  noticed increased in breathing effort and wheeze. SOB is aggravated with laying flat, therefore decided to bring in to hospital for further evaluation.  Other symptoms include b/l leg edema.   Other complains include right foot wound/ulcer in behind hallux, heel and top of second toe, which is being managed by  home care podiatrist Dr. Vega. Took azithromycin 250mg back in may 2022 for possible wound infection in the foot.   Denies recent respiratory infection, change in diet. Denies fever.  Patient unable to communicate of  any other symptoms such as Chest pain, palpitation, dysuria.     In ED,  EKG shows RBBB, SB with first degree AVB. BNP 1204, Troponin 0.02. 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 (documented in outpatient note 6/2021, but niece/caregiver denies hx of HF). She presents with c/o SOB/Orthopnea.   History was obtained from niece, ramakrishna who is the caregiver, patient unable to communicate due to hx CVA residual of aphasia. Wheel chair bound. Lives with niece and has home health aide.   Caregiver states onset of symptom was about a week ago which started with cough and congestion, cough resolved with OTC Robitussin. However, caregiver  noticed increased in breathing effort and wheeze. SOB is aggravated with laying flat, therefore decided to bring in to hospital for further evaluation.  Other symptoms include b/l leg edema.   Other complains include right foot wound/ulcer  behind hallux, heel and eschar on top of second toe, which is  managed by  home care podiatrist Dr. Vega. Took azithromycin 250mg back in may 2022 for possible wound infection in the foot.   Denies recent respiratory infection, change in diet. Denies fever.  Patient unable to communicate of  any other symptoms such as Chest pain, palpitation, dysuria.     In ED, 1365/60, HR 55, TEMP-36.0 celcius, 96% on 3l NC.  EKG shows RBBB, SB with first degree AVB. BNP 1204, Troponin 0.02. CXR- shows some pulm. 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 (documented in outpatient note 6/2021, but niece/caregiver denies hx of HF). She presents with c/o SOB/Orthopnea.   History was obtained from niece, ramakrishna who is the caregiver, patient unable to communicate due to hx CVA residual of aphasia. Wheel chair bound. Lives with niece and has home health aide.   Caregiver states onset of symptom was about a week ago which started with cough and congestion, cough resolved with OTC Robitussin. However, caregiver  noticed increased in breathing effort and wheeze. SOB is aggravated with laying flat, therefore decided to bring in to hospital for further evaluation.  Other symptoms include b/l leg edema. Patient does not have a cardiologist.  Other complains include right foot wound/ulcer  behind hallux, heel and eschar on top of second toe, which is managed by  home care podiatrist Dr. Vega. Took azithromycin 250mg back in may 2022 for possible wound infection in the foot.   Denies recent respiratory infection, change in diet. Denies fever.  Patient unable to communicate of  any other symptoms such as Chest pain, palpitation, dysuria.     In ED, 1365/60, HR 55, TEMP-36.0 celcius, 96% on 3l NC.  EKG shows RBBB, SB with first degree AVB. BNP 1204, Troponin 0.02. CXR- shows some pulm. 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 (documented in outpatient note 6/2021, but niece/caregiver denies hx of HF). She presents with c/o SOB/Orthopnea.   History was obtained from niece, ramakrishna who is the caregiver, patient unable to communicate due to hx CVA residual of aphasia. Wheel chair bound. Lives with niece and has home health aide.   Caregiver states onset of symptom was about a week ago which started with cough and congestion, cough resolved with OTC Robitussin. However, caregiver  noticed increased in breathing effort and wheeze. SOB is aggravated with laying flat, therefore decided to bring in to hospital for further evaluation.  Other symptoms include b/l leg edema. Patient does not have a cardiologist.  Other complains include right foot wound/ulcer  behind hallux, heel and eschar on top of second toe, which is managed by  home care podiatrist Dr. Vega. Took azithromycin 250mg back in may 2022 for possible wound infection in the foot.   Denies recent respiratory infection, change in diet. Denies fever.  Patient unable to communicate of  any other symptoms such as Chest pain, palpitation, dysuria.   caregiver denies any recent diagnostic cardiac studies.   In ED, 1365/60, HR 55, TEMP-36.0 celsius, 96% on 3l NC.  EKG shows RBBB, SB with first degree AVB. BNP 1204, Troponin 0.02. CXR- shows some pulm. congestion    Patient will be admitted to cardiology telemetry for further workup for acute on chronic HF

## 2022-07-11 LAB
ANION GAP SERPL CALC-SCNC: 15 MMOL/L — HIGH (ref 7–14)
APTT BLD: 55.3 SEC — HIGH (ref 27–39.2)
BUN SERPL-MCNC: 36 MG/DL — HIGH (ref 10–20)
CALCIUM SERPL-MCNC: 8.7 MG/DL — SIGNIFICANT CHANGE UP (ref 8.5–10.1)
CHLORIDE SERPL-SCNC: 98 MMOL/L — SIGNIFICANT CHANGE UP (ref 98–110)
CO2 SERPL-SCNC: 24 MMOL/L — SIGNIFICANT CHANGE UP (ref 17–32)
CREAT SERPL-MCNC: 1.5 MG/DL — SIGNIFICANT CHANGE UP (ref 0.7–1.5)
EGFR: 33 ML/MIN/1.73M2 — LOW
FERRITIN SERPL-MCNC: 87 NG/ML — SIGNIFICANT CHANGE UP (ref 15–150)
GLUCOSE SERPL-MCNC: 60 MG/DL — LOW (ref 70–99)
HCT VFR BLD CALC: 28.7 % — LOW (ref 37–47)
HGB BLD-MCNC: 9.7 G/DL — LOW (ref 12–16)
INR BLD: 3.59 RATIO — HIGH (ref 0.65–1.3)
MAGNESIUM SERPL-MCNC: 2.2 MG/DL — SIGNIFICANT CHANGE UP (ref 1.8–2.4)
MCHC RBC-ENTMCNC: 33.8 G/DL — SIGNIFICANT CHANGE UP (ref 32–37)
MCHC RBC-ENTMCNC: 35.1 PG — HIGH (ref 27–31)
MCV RBC AUTO: 104 FL — HIGH (ref 81–99)
NRBC # BLD: 0 /100 WBCS — SIGNIFICANT CHANGE UP (ref 0–0)
PLATELET # BLD AUTO: 183 K/UL — SIGNIFICANT CHANGE UP (ref 130–400)
POTASSIUM SERPL-MCNC: 4.4 MMOL/L — SIGNIFICANT CHANGE UP (ref 3.5–5)
POTASSIUM SERPL-SCNC: 4.4 MMOL/L — SIGNIFICANT CHANGE UP (ref 3.5–5)
PROCALCITONIN SERPL-MCNC: 0.09 NG/ML — SIGNIFICANT CHANGE UP (ref 0.02–0.1)
PROTHROM AB SERPL-ACNC: >40 SEC — HIGH (ref 9.95–12.87)
RBC # BLD: 2.76 M/UL — LOW (ref 4.2–5.4)
RBC # FLD: 15.5 % — HIGH (ref 11.5–14.5)
SODIUM SERPL-SCNC: 137 MMOL/L — SIGNIFICANT CHANGE UP (ref 135–146)
TSH SERPL-MCNC: 4.69 UIU/ML — HIGH (ref 0.27–4.2)
WBC # BLD: 4.67 K/UL — LOW (ref 4.8–10.8)
WBC # FLD AUTO: 4.67 K/UL — LOW (ref 4.8–10.8)

## 2022-07-11 PROCEDURE — 99233 SBSQ HOSP IP/OBS HIGH 50: CPT

## 2022-07-11 PROCEDURE — 93970 EXTREMITY STUDY: CPT | Mod: 26

## 2022-07-11 RX ORDER — FUROSEMIDE 40 MG
40 TABLET ORAL DAILY
Refills: 0 | Status: DISCONTINUED | OUTPATIENT
Start: 2022-07-12 | End: 2022-07-12

## 2022-07-11 RX ORDER — IRON SUCROSE 20 MG/ML
200 INJECTION, SOLUTION INTRAVENOUS EVERY 24 HOURS
Refills: 0 | Status: DISCONTINUED | OUTPATIENT
Start: 2022-07-11 | End: 2022-07-14

## 2022-07-11 RX ORDER — AMLODIPINE BESYLATE 2.5 MG/1
5 TABLET ORAL DAILY
Refills: 0 | Status: DISCONTINUED | OUTPATIENT
Start: 2022-07-11 | End: 2022-07-11

## 2022-07-11 RX ORDER — CHLORHEXIDINE GLUCONATE 213 G/1000ML
1 SOLUTION TOPICAL
Refills: 0 | Status: DISCONTINUED | OUTPATIENT
Start: 2022-07-11 | End: 2022-07-14

## 2022-07-11 RX ADMIN — Medication 40 MILLIGRAM(S): at 05:34

## 2022-07-11 RX ADMIN — IRON SUCROSE 110 MILLIGRAM(S): 20 INJECTION, SOLUTION INTRAVENOUS at 17:19

## 2022-07-11 NOTE — GOALS OF CARE CONVERSATION - ADVANCED CARE PLANNING - CONVERSATION DETAILS
Ms. Craven has lived with her niece Nedra for > 30 years. I spoke with Nedra twice today, to get a better sense of who the patient is and what her goals of care are.     Nedra informed me that Ms. Craven is mostly nonverbal, eats well at home, and was walking up to 1.5 months ago. In early March 2022, the patient was found to have an ulcer on her foot, and experienced some foot pain with ambulation. She was seen by a Podiatrist who advised the patient not to walk. Since then, Ms. Craven has been bedbound and developed contraction of her right leg.     Nedra brought the patient to the hospital because of dyspnea when lying flat. On the day of presentation (Saturday 7/9), Ms. Craven ate three meals but had difficulty breathing in her bed. Since her admission, she has been more somnolent with no interest in eating. I explained to Nedra that her aunt is liking experiencing delirium, and stated my concern that the longer Ms. Craven remains hospitalized, the more confused she will become. Nedra expressed agreement and understanding.     We also discussed how Ms. Craven has failed her Speech and Swallow evaluation. We even attempted a repeat evaluation with the aide at bedside, but Ms. Craven is holding food in her mouth and occasionally coughing when eating pudding. Nedra and I are in agreement that Ms. Craven should not get a PEG tube. We discussed the options of nasogastric tube (unclear benefit, as this would only be a temporizing measure if patient does not mentally improve) versus feeding for comfort. Nedra does not want to place a NGT, but is uncomfortable with comfort feeding at this time. Nedra will reevaluate when she visits the patient tonight.     Patient to remain DNR/DNI. Will need to discuss nutrition strategy with Nedra.    Further goals of care discussions pending Nedra's visit this evening. Options are aggressive medical management (NGT, ongoing IV Lasix, etc) versus home hospice (PO diuretics PRN, supportive care).

## 2022-07-11 NOTE — PROGRESS NOTE ADULT - SUBJECTIVE AND OBJECTIVE BOX
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 no previous ECHO on file. She presents with c/o SOB/Orthopnea and leg edema. Brought in by caregiver ( Nedra).    Patient currently admitted to cardiology telemetry for acute on chronic HFpEF.  IV diuretics.     Overnight events:  Patient still nonverbal , lethargic. no acute event overnight. SR/SB on monitor overnight.     Past medical history is notable for:  FLUID OVERLOAD, ELEVATED TROP    ^SOB    Chronic atrial fibrillation    CVA (cerebrovascular accident)    HTN (hypertension)    HLD (hyperlipidemia)    Fluid overload        Review of systems:     Vitals:  T(F): 95, Max: 96.4 ( @ 05:00)  HR: 79 (62 - 79)  BP: 125/57 (125/57 - 178/74)  RR: 18 (18 - 18)  SpO2: 96% (93% - 96%)    Ins & outs:     07-10 @ 07:01  -   @ 07:00  --------------------------------------------------------  IN: 0 mL / OUT: 1880 mL / NET: -1880 mL      Weight trend:  Weight (kg): 62 (07-10)    Physical exam:  General: No apparent distress  HEENT: Anicteric sclera. Moist mucous membranes.   Cardiac: Regular rate and rhythm. No murmurs, rubs, or gallops.   Vascular: Symmetric radial pulses. Dorsalis pedis pulses palpable.   Respiratory: Normal effort. Bibasilar crackles  Abdomen: Soft, nontender. Audible bowel sounds.   Extremities: Warm with b/l +1 edema. No cyanosis or clubbing.   Skin: right foot ulcer, Warm and dry. No rash.   Neurologic: Grossly normal motor function.   Psychiatric: Euthymic. nonverbal    Data reviewed:  - Telemetry: sinus rhythm     - ECG < from: 12 Lead ECG (07.10.22 @ 01:18) >  sinus bradycardia with 1st degree A-V block  Right bundle branch block  Septal infarct , age undetermined  Abnormal ECG    < end of copied text >    - Echo  < from: TTE Echo Complete w/o Contrast w/ Doppler (07.10.22 @ 08:00) >    Summary:   1. LV Ejection Fraction by Gamble's Method with a biplane EF of 69 %.   2. Elevated mean left atrial pressure.   3. Spectral Doppler shows pseudonormal pattern of left ventricular   myocardial filling (Grade II diastolic dysfunction).   4. Moderately enlarged left atrium.   5. Normal right atrial size.   6. Moderate mitral valve regurgitation.   7. Moderate tricuspid regurgitation.   8. Mild aortic regurgitation.   9. Moderate aortic valve stenosis.  10. Moderate pulmonic valve regurgitation.  11. Estimated pulmonary artery systolic pressure is 80.1 mmHg assuming a   right atrial pressure of 3 mmHg, which is consistent with severe   pulmonary hypertension.    < end of copied text >    - Radiology:  < from: Xray Chest 1 View AP/PA (07.10.22 @ 14:16) >  Impression:    1. Slightly decreased bibasilar opacities/pleural effusions.    < end of copied text >     Xray Foot AP + Lateral, Right (07.10.22 @ 14:19) >    Osteopenia.Apparent ulcer along the plantar aspect of the first MTP   joint. Nonspecific cystic/erosive change of the sesamoid may be   degenerative versus osteomyelitis, close follow-up recommended.   Additional ulcer along the posterior calcaneus and questionably along the   tip of the first toe. No definite radiographic evidence for osteomyelitis   in these regions.    Degenerative change in the midfoot. Calcaneal heel spur. Vascular   calcifications.    < end of copied text >      - Labs:                        9.7    4.67  )-----------( 183      ( 2022 06:18 )             28.7     07-11    137  |  98  |  36<H>  ----------------------------<  60<L>  4.4   |  24  |  1.5    Ca    8.7      2022 06:18  Mg     2.2     07-11    TPro  6.7  /  Alb  3.4<L>  /  TBili  0.5  /  DBili  x   /  AST  27  /  ALT  19  /  AlkPhos  271<H>  07-10    LIVER FUNCTIONS - ( 10 Jul 2022 01:20 )  Alb: 3.4 g/dL / Pro: 6.7 g/dL / ALK PHOS: 271 U/L / ALT: 19 U/L / AST: 27 U/L / GGT: x           PT/INR - ( 2022 06:18 )   PT: >40.00 sec;   INR: 3.59 ratio         PTT - ( 2022 06:18 )  PTT:55.3 sec    CARDIAC MARKERS ( 10 Jul 2022 12:20 )  x     / 0.01 ng/mL / x     / x     / x      CARDIAC MARKERS ( 10 Jul 2022 01:20 )  x     / 0.02 ng/mL / x     / x     / x          Serum Pro-Brain Natriuretic Peptide: 1204 pg/mL (07-10)    Urinalysis Basic - ( 10 Jul 2022 12:50 )    Color: Colorless / Appearance: Clear / S.007 / pH: x  Gluc: x / Ketone: Negative  / Bili: Negative / Urobili: <2 mg/dL   Blood: x / Protein: Trace / Nitrite: Negative   Leuk Esterase: Negative / RBC: 3 /HPF / WBC 1 /HPF   Sq Epi: x / Non Sq Epi: 1 /HPF / Bacteria: Negative      - Cultures:    Medications:  chlorhexidine 4% Liquid 1 Application(s) Topical <User Schedule>  iron sucrose IVPB 200 milliGRAM(s) IV Intermittent every 24 hours             Chief complaint: Patient is a 89y old  Female who presents with a chief complaint of SOB/orthopnea     Interval history  Patient is a 89 y/o female with history of chronic afib ( last documented afib on ekg 2018, on warfarin) , HTN, HLD, 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 no previous ECHO on file. She presents with c/o SOB/Orthopnea and leg edema. Brought in by caregiver ( Nedra).    Patient currently admitted to cardiology telemetry. Found to have pulmonary edema on exam and CXR, along with elevated BNP. Possible previous history of HFpEF, but no documented TTE. Possible acute on chronic HFpEF exacerbation. IV diuresis.  Mild troponemia which may be due to demand. Tele shows sinus bradycardia and EKG nonischemic.  Overnight events:  Patient still nonverbal , lethargic. No acute event overnight. SR/SB on monitor overnight.     Past medical history is notable for:  FLUID OVERLOAD, ELEVATED TROP    ^SOB    Chronic atrial fibrillation    CVA (cerebrovascular accident)    HTN (hypertension)    HLD (hyperlipidemia)    Fluid overload        Review of systems:     Vitals:  T(F): 95, Max: 96.4 ( @ 05:00)  HR: 79 (62 - 79)  BP: 125/57 (125/57 - 178/74)  RR: 18 (18 - 18)  SpO2: 96% (93% - 96%)    Ins & outs:     07-10 @ 07:01  -  11 @ 07:00  --------------------------------------------------------  IN: 0 mL / OUT: 1880 mL / NET: -1880 mL      Weight trend:  Weight (kg): 62 (07-10)    Physical exam:  General: No apparent distress  HEENT: Anicteric sclera. Moist mucous membranes.   Cardiac: Regular rate and rhythm. No murmurs, rubs, or gallops.   Vascular: Symmetric radial pulses. Dorsalis pedis pulses palpable.   Respiratory: Normal effort. Bibasilar crackles  Abdomen: Soft, nontender. Audible bowel sounds.   Extremities: Warm with b/l +1 edema. No cyanosis or clubbing.   Skin: right foot ulcer, Warm and dry. No rash.   Neurologic: Grossly normal motor function.   Psychiatric: Euthymic. nonverbal    Data reviewed:  - Telemetry: sinus rhythm     - ECG < from: 12 Lead ECG (07.10.22 @ 01:18) >  sinus bradycardia with 1st degree A-V block  Right bundle branch block  Septal infarct , age undetermined  Abnormal ECG    < end of copied text >    - Echo  < from: TTE Echo Complete w/o Contrast w/ Doppler (07.10.22 @ 08:00) >    Summary:   1. LV Ejection Fraction by Gamble's Method with a biplane EF of 69 %.   2. Elevated mean left atrial pressure.   3. Spectral Doppler shows pseudonormal pattern of left ventricular   myocardial filling (Grade II diastolic dysfunction).   4. Moderately enlarged left atrium.   5. Normal right atrial size.   6. Moderate mitral valve regurgitation.   7. Moderate tricuspid regurgitation.   8. Mild aortic regurgitation.   9. Moderate aortic valve stenosis.  10. Moderate pulmonic valve regurgitation.  11. Estimated pulmonary artery systolic pressure is 80.1 mmHg assuming a   right atrial pressure of 3 mmHg, which is consistent with severe   pulmonary hypertension.    < end of copied text >    - Radiology:  < from: Xray Chest 1 View AP/PA (07.10.22 @ 14:16) >  Impression:    1. Slightly decreased bibasilar opacities/pleural effusions.    < end of copied text >     Xray Foot AP + Lateral, Right (07.10.22 @ 14:19) >    Osteopenia.Apparent ulcer along the plantar aspect of the first MTP   joint. Nonspecific cystic/erosive change of the sesamoid may be   degenerative versus osteomyelitis, close follow-up recommended.   Additional ulcer along the posterior calcaneus and questionably along the   tip of the first toe. No definite radiographic evidence for osteomyelitis   in these regions.    Degenerative change in the midfoot. Calcaneal heel spur. Vascular   calcifications.    < end of copied text >      - Labs:                        9.7    4.67  )-----------( 183      ( 2022 06:18 )             28.7     07    137  |  98  |  36<H>  ----------------------------<  60<L>  4.4   |  24  |  1.5    Ca    8.7      2022 06:18  Mg     2.2     -    TPro  6.7  /  Alb  3.4<L>  /  TBili  0.5  /  DBili  x   /  AST  27  /  ALT  19  /  AlkPhos  271<H>  07-10    LIVER FUNCTIONS - ( 10 Jul 2022 01:20 )  Alb: 3.4 g/dL / Pro: 6.7 g/dL / ALK PHOS: 271 U/L / ALT: 19 U/L / AST: 27 U/L / GGT: x           PT/INR - ( 2022 06:18 )   PT: >40.00 sec;   INR: 3.59 ratio         PTT - ( 2022 06:18 )  PTT:55.3 sec    CARDIAC MARKERS ( 10 Jul 2022 12:20 )  x     / 0.01 ng/mL / x     / x     / x      CARDIAC MARKERS ( 10 Jul 2022 01:20 )  x     / 0.02 ng/mL / x     / x     / x          Serum Pro-Brain Natriuretic Peptide: 1204 pg/mL (07-10)    Urinalysis Basic - ( 10 Jul 2022 12:50 )    Color: Colorless / Appearance: Clear / S.007 / pH: x  Gluc: x / Ketone: Negative  / Bili: Negative / Urobili: <2 mg/dL   Blood: x / Protein: Trace / Nitrite: Negative   Leuk Esterase: Negative / RBC: 3 /HPF / WBC 1 /HPF   Sq Epi: x / Non Sq Epi: 1 /HPF / Bacteria: Negative      - Cultures:    Medications:  chlorhexidine 4% Liquid 1 Application(s) Topical <User Schedule>  iron sucrose IVPB 200 milliGRAM(s) IV Intermittent every 24 hours

## 2022-07-11 NOTE — CHART NOTE - NSCHARTNOTEFT_GEN_A_CORE
Patient requires hospital bed for home with gel overlay.  Due to dx:I27.20, I50.9, L97.519, R41.81  Patient requires frequent position changes to prevent skin breakdown.  Pillows and wedges have been tried and failed.  Patient requires head of bed to be elevated greater than 30 degrees. Pt is at risk of aspiration. Patient requires hospital bed for home with gel overlay.  Due to dx: I50.3 (heart failure with preserved ejection fraction)  Patient requires frequent position changes to prevent skin breakdown.  Pillows and wedges have been tried and failed.  Patient requires head of bed to be elevated greater than 30 degrees. Pt is at risk of aspiration. Patient requires semi-electric hospital bed for home with gel overlay.  Due to dx: I50.3 (heart failure with preserved ejection fraction)  Patient requires frequent position changes to prevent skin breakdown.  Pillows and wedges have been tried and failed.  Patient requires head of bed to be elevated greater than 30 degrees. Pt is at risk of aspiration.

## 2022-07-11 NOTE — PROGRESS NOTE ADULT - ASSESSMENT
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 no previous ECHO on file. She presents with c/o SOB/Orthopnea and leg edema. Brought in by caregiver ( Nedra).      IMPRESSION/PLAN    Possible Acute on chronic HFpEF  BNP- 1204, Trop 0.02->>0.01, CXR- vascular congestion on admission  -TTE EF 69% mod enlarge left atrium, moderate MR, Mod TR, moderate AS and sever pulmonary HTN   - Repeat chest xray shows slight improvement in pleural effusion  - given patient with poor intake, lasix decreased from 40mg bid to once daily   - monitor electrolytes MG>2, K>4  -  procalcitonin normal - 0.09,   - Ferritin 87, START iron sucrose 200mg IV daily x 5 days    -titrate supplemental O2 to >92%  - obtain VBG   -strict I&O, daily weights      Chronic AFIB-(last documented afib on ekg 2018)  - currently on EKG - SB with first degree AVB, 43 bpm- HOLD Cardizem 180mg   - inr 3.59 HOLD  warfarin  - daily check pt/inr,     HTN  - VS Per routine  - cardizem on HOLD, SB 43bpm   - amlodipine 5mg ordered, unable to tolerate PO intake now, If needed based on BP reading will order Antihtn med via IV     Right Foot ulcer- arterial ulcer /pressure ulcer  -podiatry consult appreciated  -  elevated / CRP -37.2- XRAY foot negative for osteomyelitis   -  arterial doppler pending   - wound care - betadine paint right foot     Electrolyte imbalance on admission- improved     --> 137   k 5.5-->4.4  - check BMP daily    HX CVA with left sided hemiparesis  - PT eval done unable to participate  -speech and swallow done, recommended NPO vs alternate means of nutrition /hydration  - will discuss GOC with caregiver    HLD  - atorvastatin on HOLD, due to inability to tolerate PO intake     CODE STATUS: DNR/DNI

## 2022-07-11 NOTE — PROGRESS NOTE ADULT - NS ATTEND AMEND GEN_ALL_CORE FT
Agree with above.     Patient was at her baseline mental status on the day of presentation (awake, interactive, nonverbal, eating three meals per day). She was brought to the hospital for dyspnea when lying flat. Since her admission, patient has been more somnolent and not tolerating PO intake. She has been evaluated by Speech and Swallow multiple times with and without her aide at bedside, and she is high risk for aspiration. Currently NPO. Please see [facility] note regarding ongoing discussions with jolene Sneed regarding placement of NGT versus feeding for comfort.     With regard to her presenting complaint, patient with bilateral pleural effusions on CXR. She is undergoing IV diuresis with good urine output. Will reduce IV Lasix to once daily as patient not eating.     With regard to her somnolence, I am worried about infection. A bacterial infection is unlikely as procalcitonin is 0.09 (wnl) and UA shows no bacteria. She was evaluated by Podiatry for osteomyelitis, and was recommended for outpatient follow-up. Although ESR and CRP are elevated, foot x-ray does not show radiographic findings of osteomyelitis. Will continue to monitor with low threshold to treat with antibiotics. I am also worried about hypercapnea, and a VBG is ordered for next set of labs.     Lastly, patient with Hgb 9s and iron deficiency on lab work. Will start IV iron.

## 2022-07-12 LAB
ANION GAP SERPL CALC-SCNC: 17 MMOL/L — HIGH (ref 7–14)
APTT BLD: 60.5 SEC — HIGH (ref 27–39.2)
BUN SERPL-MCNC: 36 MG/DL — HIGH (ref 10–20)
CALCIUM SERPL-MCNC: 8.7 MG/DL — SIGNIFICANT CHANGE UP (ref 8.5–10.1)
CHLORIDE SERPL-SCNC: 106 MMOL/L — SIGNIFICANT CHANGE UP (ref 98–110)
CO2 SERPL-SCNC: 24 MMOL/L — SIGNIFICANT CHANGE UP (ref 17–32)
CREAT SERPL-MCNC: 1.4 MG/DL — SIGNIFICANT CHANGE UP (ref 0.7–1.5)
EGFR: 36 ML/MIN/1.73M2 — LOW
GLUCOSE SERPL-MCNC: 61 MG/DL — LOW (ref 70–99)
HCT VFR BLD CALC: 30.3 % — LOW (ref 37–47)
HGB BLD-MCNC: 9.8 G/DL — LOW (ref 12–16)
INR BLD: 4.51 RATIO — HIGH (ref 0.65–1.3)
MAGNESIUM SERPL-MCNC: 2 MG/DL — SIGNIFICANT CHANGE UP (ref 1.8–2.4)
MCHC RBC-ENTMCNC: 32.3 G/DL — SIGNIFICANT CHANGE UP (ref 32–37)
MCHC RBC-ENTMCNC: 33.9 PG — HIGH (ref 27–31)
MCV RBC AUTO: 104.8 FL — HIGH (ref 81–99)
NRBC # BLD: 0 /100 WBCS — SIGNIFICANT CHANGE UP (ref 0–0)
PLATELET # BLD AUTO: 176 K/UL — SIGNIFICANT CHANGE UP (ref 130–400)
POTASSIUM SERPL-MCNC: 4.4 MMOL/L — SIGNIFICANT CHANGE UP (ref 3.5–5)
POTASSIUM SERPL-SCNC: 4.4 MMOL/L — SIGNIFICANT CHANGE UP (ref 3.5–5)
PROTHROM AB SERPL-ACNC: >40 SEC — HIGH (ref 9.95–12.87)
RBC # BLD: 2.89 M/UL — LOW (ref 4.2–5.4)
RBC # FLD: 15.8 % — HIGH (ref 11.5–14.5)
SODIUM SERPL-SCNC: 147 MMOL/L — HIGH (ref 135–146)
T4 FREE SERPL-MCNC: 1.6 NG/DL — SIGNIFICANT CHANGE UP (ref 0.9–1.8)
WBC # BLD: 7.59 K/UL — SIGNIFICANT CHANGE UP (ref 4.8–10.8)
WBC # FLD AUTO: 7.59 K/UL — SIGNIFICANT CHANGE UP (ref 4.8–10.8)

## 2022-07-12 PROCEDURE — 99233 SBSQ HOSP IP/OBS HIGH 50: CPT

## 2022-07-12 PROCEDURE — 71045 X-RAY EXAM CHEST 1 VIEW: CPT | Mod: 26

## 2022-07-12 RX ORDER — FUROSEMIDE 40 MG
20 TABLET ORAL DAILY
Refills: 0 | Status: DISCONTINUED | OUTPATIENT
Start: 2022-07-14 | End: 2022-07-14

## 2022-07-12 RX ADMIN — IRON SUCROSE 110 MILLIGRAM(S): 20 INJECTION, SOLUTION INTRAVENOUS at 17:41

## 2022-07-12 RX ADMIN — CHLORHEXIDINE GLUCONATE 1 APPLICATION(S): 213 SOLUTION TOPICAL at 05:42

## 2022-07-12 RX ADMIN — Medication 40 MILLIGRAM(S): at 05:42

## 2022-07-12 NOTE — DIETITIAN INITIAL EVALUATION ADULT - PERTINENT LABORATORY DATA
07-12    147<H>  |  106  |  36<H>  ----------------------------<  61<L>  4.4   |  24  |  1.4    Ca    8.7      12 Jul 2022 04:30  Mg     2.0     07-12

## 2022-07-12 NOTE — PROGRESS NOTE ADULT - SUBJECTIVE AND OBJECTIVE BOX
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 no previous ECHO on file. She presents with c/o SOB/Orthopnea and leg edema. Brought in by caregiver ( Nedra).    Patient currently admitted to cardiology telemetry. Found to have pulmonary edema on exam and CXR, along with elevated BNP. Possible previous history of HFpEF, but no documented TTE. Possible acute on chronic HFpEF exacerbation. IV diuresis.  Mild troponemia which may be due to demand. Tele shows sinus bradycardia and EKG nonischemic.  Overnight events:  Patient still nonverbal , lethargic. No acute event overnight. SR/SB on monitor overnight.     Past medical history is notable for:  FLUID OVERLOAD, ELEVATED TROP    ^SOB    Chronic atrial fibrillation    CVA (cerebrovascular accident)    HTN (hypertension)    HLD (hyperlipidemia)    Fluid overload        Review of systems:     Vitals:  T(F): 95, Max: 96.4 ( @ 05:00)  HR: 79 (62 - 79)  BP: 125/57 (125/57 - 178/74)  RR: 18 (18 - 18)  SpO2: 96% (93% - 96%)    Ins & outs:     07-10 @ 07:01  -  11 @ 07:00  --------------------------------------------------------  IN: 0 mL / OUT: 1880 mL / NET: -1880 mL      Weight trend:  Weight (kg): 62 (07-10)    Physical exam:  General: No apparent distress  HEENT: Anicteric sclera. Moist mucous membranes.   Cardiac: Regular rate and rhythm. No murmurs, rubs, or gallops.   Vascular: Symmetric radial pulses. Dorsalis pedis pulses palpable.   Respiratory: Normal effort. Bibasilar crackles  Abdomen: Soft, nontender. Audible bowel sounds.   Extremities: Warm with b/l +1 edema. No cyanosis or clubbing.   Skin: right foot ulcer, Warm and dry. No rash.   Neurologic: Grossly normal motor function.   Psychiatric: Euthymic. nonverbal    Data reviewed:  - Telemetry: sinus rhythm     - ECG < from: 12 Lead ECG (07.10.22 @ 01:18) >  sinus bradycardia with 1st degree A-V block  Right bundle branch block  Septal infarct , age undetermined  Abnormal ECG    < end of copied text >    - Echo  < from: TTE Echo Complete w/o Contrast w/ Doppler (07.10.22 @ 08:00) >    Summary:   1. LV Ejection Fraction by Gamble's Method with a biplane EF of 69 %.   2. Elevated mean left atrial pressure.   3. Spectral Doppler shows pseudonormal pattern of left ventricular   myocardial filling (Grade II diastolic dysfunction).   4. Moderately enlarged left atrium.   5. Normal right atrial size.   6. Moderate mitral valve regurgitation.   7. Moderate tricuspid regurgitation.   8. Mild aortic regurgitation.   9. Moderate aortic valve stenosis.  10. Moderate pulmonic valve regurgitation.  11. Estimated pulmonary artery systolic pressure is 80.1 mmHg assuming a   right atrial pressure of 3 mmHg, which is consistent with severe   pulmonary hypertension.    < end of copied text >    - Radiology:  < from: Xray Chest 1 View AP/PA (07.10.22 @ 14:16) >  Impression:    1. Slightly decreased bibasilar opacities/pleural effusions.    < end of copied text >     Xray Foot AP + Lateral, Right (07.10.22 @ 14:19) >    Osteopenia.Apparent ulcer along the plantar aspect of the first MTP   joint. Nonspecific cystic/erosive change of the sesamoid may be   degenerative versus osteomyelitis, close follow-up recommended.   Additional ulcer along the posterior calcaneus and questionably along the   tip of the first toe. No definite radiographic evidence for osteomyelitis   in these regions.    Degenerative change in the midfoot. Calcaneal heel spur. Vascular   calcifications.    < end of copied text >      - Labs:                        9.7    4.67  )-----------( 183      ( 2022 06:18 )             28.7     07    137  |  98  |  36<H>  ----------------------------<  60<L>  4.4   |  24  |  1.5    Ca    8.7      2022 06:18  Mg     2.2     -    TPro  6.7  /  Alb  3.4<L>  /  TBili  0.5  /  DBili  x   /  AST  27  /  ALT  19  /  AlkPhos  271<H>  07-10    LIVER FUNCTIONS - ( 10 Jul 2022 01:20 )  Alb: 3.4 g/dL / Pro: 6.7 g/dL / ALK PHOS: 271 U/L / ALT: 19 U/L / AST: 27 U/L / GGT: x           PT/INR - ( 2022 06:18 )   PT: >40.00 sec;   INR: 3.59 ratio         PTT - ( 2022 06:18 )  PTT:55.3 sec    CARDIAC MARKERS ( 10 Jul 2022 12:20 )  x     / 0.01 ng/mL / x     / x     / x      CARDIAC MARKERS ( 10 Jul 2022 01:20 )  x     / 0.02 ng/mL / x     / x     / x          Serum Pro-Brain Natriuretic Peptide: 1204 pg/mL (07-10)    Urinalysis Basic - ( 10 Jul 2022 12:50 )    Color: Colorless / Appearance: Clear / S.007 / pH: x  Gluc: x / Ketone: Negative  / Bili: Negative / Urobili: <2 mg/dL   Blood: x / Protein: Trace / Nitrite: Negative   Leuk Esterase: Negative / RBC: 3 /HPF / WBC 1 /HPF   Sq Epi: x / Non Sq Epi: 1 /HPF / Bacteria: Negative      - Cultures:    Medications:  chlorhexidine 4% Liquid 1 Application(s) Topical <User Schedule>  iron sucrose IVPB 200 milliGRAM(s) IV Intermittent every 24 hours             Chief complaint: Patient is a 89y old  Female who presents with a chief complaint of SOB/orthopnea     Interval history  Patient is a 89 y/o female with history of chronic afib ( last documented afib on ekg 2018, on warfarin) , HTN, HLD, 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 no previous ECHO on file. She presents with c/o SOB/Orthopnea and leg edema. Brought in by caregiver ( Nedra).    Patient currently admitted to cardiology telemetry. Found to have pulmonary edema on exam and CXR, along with elevated BNP. Possible previous history of HFpEF, but no documented TTE. Possible acute on chronic HFpEF exacerbation. IV diuresis.  Mild troponemia which may be due to demand. Tele shows sinus bradycardia and EKG nonischemic.  Overnight events:  Patient able to communicate but not making sense. No acute events overnight. AFIB on telemetry.     Past medical history is notable for:  FLUID OVERLOAD, ELEVATED TROP    ^SOB    Chronic atrial fibrillation    CVA (cerebrovascular accident)    HTN (hypertension)    HLD (hyperlipidemia)    Fluid overload        Review of systems:     Vitals:  T(F): 95, Max: 96.4 ( @ 05:00)  HR: 79 (62 - 79)  BP: 125/57 (125/57 - 178/74)  RR: 18 (18 - 18)  SpO2: 96% (93% - 96%)    Ins & outs:     07-10 @ 07:01  -  -11 @ 07:00  --------------------------------------------------------  IN: 0 mL / OUT: 1880 mL / NET: -1880 mL      Weight trend:  Weight (kg): 62 (07-10)    Physical exam:  General: No apparent distress  HEENT: Anicteric sclera. Moist mucous membranes.   Cardiac: Regular rate and rhythm. No murmurs, rubs, or gallops.   Vascular: Symmetric radial pulses. Dorsalis pedis pulses palpable.   Respiratory: Normal effort. Bibasilar crackles  Abdomen: Soft, nontender. Audible bowel sounds.   Extremities: Warm with b/l +1 edema. No cyanosis or clubbing.   Skin: right foot ulcer, Warm and dry. No rash.   Neurologic: Grossly normal motor function.   Psychiatric: Euthymic. Aphasic     Data reviewed:  - Telemetry: sinus rhythm     - ECG < from: 12 Lead ECG (07.10.22 @ 01:18) >  sinus bradycardia with 1st degree A-V block  Right bundle branch block  Septal infarct , age undetermined  Abnormal ECG    < end of copied text >    - Echo  < from: TTE Echo Complete w/o Contrast w/ Doppler (07.10.22 @ 08:00) >    Summary:   1. LV Ejection Fraction by Gamble's Method with a biplane EF of 69 %.   2. Elevated mean left atrial pressure.   3. Spectral Doppler shows pseudonormal pattern of left ventricular   myocardial filling (Grade II diastolic dysfunction).   4. Moderately enlarged left atrium.   5. Normal right atrial size.   6. Moderate mitral valve regurgitation.   7. Moderate tricuspid regurgitation.   8. Mild aortic regurgitation.   9. Moderate aortic valve stenosis.  10. Moderate pulmonic valve regurgitation.  11. Estimated pulmonary artery systolic pressure is 80.1 mmHg assuming a   right atrial pressure of 3 mmHg, which is consistent with severe   pulmonary hypertension.    < end of copied text >    - Radiology:  < from: Xray Chest 1 View AP/PA (07.10.22 @ 14:16) >  Impression:    1. Slightly decreased bibasilar opacities/pleural effusions.    < end of copied text >     Xray Foot AP + Lateral, Right (07.10.22 @ 14:19) >    Osteopenia.Apparent ulcer along the plantar aspect of the first MTP   joint. Nonspecific cystic/erosive change of the sesamoid may be   degenerative versus osteomyelitis, close follow-up recommended.   Additional ulcer along the posterior calcaneus and questionably along the   tip of the first toe. No definite radiographic evidence for osteomyelitis   in these regions.    Degenerative change in the midfoot. Calcaneal heel spur. Vascular   calcifications.    < end of copied text >      - Labs:                        9.7    4.67  )-----------( 183      ( 2022 06:18 )             28.7     07    137  |  98  |  36<H>  ----------------------------<  60<L>  4.4   |  24  |  1.5    Ca    8.7      2022 06:18  Mg     2.2     07-11    TPro  6.7  /  Alb  3.4<L>  /  TBili  0.5  /  DBili  x   /  AST  27  /  ALT  19  /  AlkPhos  271<H>  07-10    LIVER FUNCTIONS - ( 10 Jul 2022 01:20 )  Alb: 3.4 g/dL / Pro: 6.7 g/dL / ALK PHOS: 271 U/L / ALT: 19 U/L / AST: 27 U/L / GGT: x           PT/INR - ( 2022 06:18 )   PT: >40.00 sec;   INR: 3.59 ratio         PTT - ( 2022 06:18 )  PTT:55.3 sec    CARDIAC MARKERS ( 10 Jul 2022 12:20 )  x     / 0.01 ng/mL / x     / x     / x      CARDIAC MARKERS ( 10 Jul 2022 01:20 )  x     / 0.02 ng/mL / x     / x     / x          Serum Pro-Brain Natriuretic Peptide: 1204 pg/mL (07-10)    Urinalysis Basic - ( 10 Jul 2022 12:50 )    Color: Colorless / Appearance: Clear / S.007 / pH: x  Gluc: x / Ketone: Negative  / Bili: Negative / Urobili: <2 mg/dL   Blood: x / Protein: Trace / Nitrite: Negative   Leuk Esterase: Negative / RBC: 3 /HPF / WBC 1 /HPF   Sq Epi: x / Non Sq Epi: 1 /HPF / Bacteria: Negative      - Cultures:    Medications:  chlorhexidine 4% Liquid 1 Application(s) Topical <User Schedule>  iron sucrose IVPB 200 milliGRAM(s) IV Intermittent every 24 hours             Chief complaint: Patient is a 89y old  Female who presents with a chief complaint of SOB/orthopnea     Interval history  Patient is a 89 y/o female with history of chronic afib ( last documented afib on ekg , on warfarin) , HTN, HLD, 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 no previous ECHO on file. She presents with c/o SOB/Orthopnea and leg edema. Brought in by caregiver ( Nedra).    Patient currently admitted to cardiology telemetry. Found to have pulmonary edema on exam and CXR, along with elevated BNP. Possible previous history of HFpEF, but no documented TTE. Possible acute on chronic HFpEF exacerbation. IV diuresis.  Mild troponemia which may be due to demand. Tele shows sinus bradycardia and EKG nonischemic.  Overnight events:  Patient able to communicate but not making sense. No acute events overnight. AFIB on telemetry, HR 90s.     Past medical history is notable for:  FLUID OVERLOAD, ELEVATED TROP    ^SOB    Chronic atrial fibrillation    CVA (cerebrovascular accident)    HTN (hypertension)    HLD (hyperlipidemia)    Fluid overload        Review of systems:     Vitals:  T(F): 95, Max: 96.4 ( @ 05:00)  HR: 79 (62 - 79)  BP: 125/57 (125/57 - 178/74)  RR: 18 (18 - 18)  SpO2: 96% (93% - 96%)    Ins & outs:     07-10 @ 07:01  -   @ 07:00  --------------------------------------------------------  IN: 0 mL / OUT: 1880 mL / NET: -1880 mL      Weight trend:  Weight (kg): 62 (07-10)    Physical exam:  General: No apparent distress  HEENT: Anicteric sclera. Moist mucous membranes.   Cardiac: Regular rate and rhythm. No murmurs, rubs, or gallops.   Vascular: Symmetric radial pulses. Dorsalis pedis pulses palpable.   Respiratory: Normal effort. Bibasilar crackles  Abdomen: Soft, nontender. Audible bowel sounds.   Extremities: Warm with b/l +1 edema. No cyanosis or clubbing.   Skin: right foot ulcer, Warm and dry. No rash.   Neurologic: Grossly normal motor function.   Psychiatric: Euthymic. Aphasic     Data reviewed:  - Telemetry: sinus rhythm     - ECG < from: 12 Lead ECG (07.10.22 @ 01:18) >  sinus bradycardia with 1st degree A-V block  Right bundle branch block  Septal infarct , age undetermined  Abnormal ECG    < end of copied text >    - Echo  < from: TTE Echo Complete w/o Contrast w/ Doppler (07.10.22 @ 08:00) >    Summary:   1. LV Ejection Fraction by Gamble's Method with a biplane EF of 69 %.   2. Elevated mean left atrial pressure.   3. Spectral Doppler shows pseudonormal pattern of left ventricular   myocardial filling (Grade II diastolic dysfunction).   4. Moderately enlarged left atrium.   5. Normal right atrial size.   6. Moderate mitral valve regurgitation.   7. Moderate tricuspid regurgitation.   8. Mild aortic regurgitation.   9. Moderate aortic valve stenosis.  10. Moderate pulmonic valve regurgitation.  11. Estimated pulmonary artery systolic pressure is 80.1 mmHg assuming a   right atrial pressure of 3 mmHg, which is consistent with severe   pulmonary hypertension.    < end of copied text >    - Radiology:  < from: Xray Chest 1 View AP/PA (07.10.22 @ 14:16) >  Impression:    1. Slightly decreased bibasilar opacities/pleural effusions.    < end of copied text >     Xray Foot AP + Lateral, Right (07.10.22 @ 14:19) >    Osteopenia.Apparent ulcer along the plantar aspect of the first MTP   joint. Nonspecific cystic/erosive change of the sesamoid may be   degenerative versus osteomyelitis, close follow-up recommended.   Additional ulcer along the posterior calcaneus and questionably along the   tip of the first toe. No definite radiographic evidence for osteomyelitis   in these regions.    Degenerative change in the midfoot. Calcaneal heel spur. Vascular   calcifications.    < end of copied text >      - Labs:                        9.7    4.67  )-----------( 183      ( 2022 06:18 )             28.7     07    137  |  98  |  36<H>  ----------------------------<  60<L>  4.4   |  24  |  1.5    Ca    8.7      2022 06:18  Mg     2.2         TPro  6.7  /  Alb  3.4<L>  /  TBili  0.5  /  DBili  x   /  AST  27  /  ALT  19  /  AlkPhos  271<H>  07-10    LIVER FUNCTIONS - ( 10 Jul 2022 01:20 )  Alb: 3.4 g/dL / Pro: 6.7 g/dL / ALK PHOS: 271 U/L / ALT: 19 U/L / AST: 27 U/L / GGT: x           PT/INR - ( 2022 06:18 )   PT: >40.00 sec;   INR: 3.59 ratio         PTT - ( 2022 06:18 )  PTT:55.3 sec    CARDIAC MARKERS ( 10 Jul 2022 12:20 )  x     / 0.01 ng/mL / x     / x     / x      CARDIAC MARKERS ( 10 Jul 2022 01:20 )  x     / 0.02 ng/mL / x     / x     / x          Serum Pro-Brain Natriuretic Peptide: 1204 pg/mL (07-10)    Urinalysis Basic - ( 10 Jul 2022 12:50 )    Color: Colorless / Appearance: Clear / S.007 / pH: x  Gluc: x / Ketone: Negative  / Bili: Negative / Urobili: <2 mg/dL   Blood: x / Protein: Trace / Nitrite: Negative   Leuk Esterase: Negative / RBC: 3 /HPF / WBC 1 /HPF   Sq Epi: x / Non Sq Epi: 1 /HPF / Bacteria: Negative      - Cultures:    Medications:  chlorhexidine 4% Liquid 1 Application(s) Topical <User Schedule>  iron sucrose IVPB 200 milliGRAM(s) IV Intermittent every 24 hours             Chief complaint: Patient is a 89y old  Female who presents with a chief complaint of SOB/orthopnea     Interval history  Patient is a 89 y/o female with history of chronic afib (last documented afib on ekg , on warfarin) , HTN, HLD, 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 no previous ECHO on file. She presents with c/o SOB/Orthopnea and leg edema. Brought in by caregiver ( Nedra).    Patient currently admitted to cardiology telemetry. Found to have pulmonary edema on exam and CXR, along with elevated BNP. Possible previous history of HFpEF, but no documented TTE. Possible acute on chronic HFpEF exacerbation. IV diuresis.  Mild troponemia which may be due to demand. Tele shows sinus bradycardia and EKG nonischemic.  Overnight events:  Patient able to communicate but not making sense. No acute events overnight. AFIB on telemetry, HR 90s.     Past medical history is notable for:  FLUID OVERLOAD, ELEVATED TROP    ^SOB    Chronic atrial fibrillation    CVA (cerebrovascular accident)    HTN (hypertension)    HLD (hyperlipidemia)    Fluid overload        Review of systems:     Vitals:  T(F): 95, Max: 96.4 ( @ 05:00)  HR: 79 (62 - 79)  BP: 125/57 (125/57 - 178/74)  RR: 18 (18 - 18)  SpO2: 96% (93% - 96%)    Ins & outs:     07-10 @ 07:01  -   @ 07:00  --------------------------------------------------------  IN: 0 mL / OUT: 1880 mL / NET: -1880 mL      Weight trend:  Weight (kg): 62 (07-10)    Physical exam:  General: No apparent distress  HEENT: Anicteric sclera. Moist mucous membranes.   Cardiac: Regular rate and rhythm. No murmurs, rubs, or gallops.   Vascular: Symmetric radial pulses. Dorsalis pedis pulses palpable.   Respiratory: Normal effort. Bibasilar crackles  Abdomen: Soft, nontender. Audible bowel sounds.   Extremities: Warm with b/l +1 edema. No cyanosis or clubbing.   Skin: right foot ulcer, Warm and dry. No rash.   Neurologic: Grossly normal motor function.   Psychiatric: Euthymic. Aphasic     Data reviewed:  - Telemetry: sinus rhythm     - ECG < from: 12 Lead ECG (07.10.22 @ 01:18) >  sinus bradycardia with 1st degree A-V block  Right bundle branch block  Septal infarct , age undetermined  Abnormal ECG    < end of copied text >    - Echo  < from: TTE Echo Complete w/o Contrast w/ Doppler (07.10.22 @ 08:00) >    Summary:   1. LV Ejection Fraction by Gamble's Method with a biplane EF of 69 %.   2. Elevated mean left atrial pressure.   3. Spectral Doppler shows pseudonormal pattern of left ventricular   myocardial filling (Grade II diastolic dysfunction).   4. Moderately enlarged left atrium.   5. Normal right atrial size.   6. Moderate mitral valve regurgitation.   7. Moderate tricuspid regurgitation.   8. Mild aortic regurgitation.   9. Moderate aortic valve stenosis.  10. Moderate pulmonic valve regurgitation.  11. Estimated pulmonary artery systolic pressure is 80.1 mmHg assuming a   right atrial pressure of 3 mmHg, which is consistent with severe   pulmonary hypertension.    < end of copied text >    - Radiology:  < from: Xray Chest 1 View AP/PA (07.10.22 @ 14:16) >  Impression:    1. Slightly decreased bibasilar opacities/pleural effusions.    < end of copied text >     Xray Foot AP + Lateral, Right (07.10.22 @ 14:19) >    Osteopenia.Apparent ulcer along the plantar aspect of the first MTP   joint. Nonspecific cystic/erosive change of the sesamoid may be   degenerative versus osteomyelitis, close follow-up recommended.   Additional ulcer along the posterior calcaneus and questionably along the   tip of the first toe. No definite radiographic evidence for osteomyelitis   in these regions.    Degenerative change in the midfoot. Calcaneal heel spur. Vascular   calcifications.    < end of copied text >      - Labs:                        9.7    4.67  )-----------( 183      ( 2022 06:18 )             28.7     07    137  |  98  |  36<H>  ----------------------------<  60<L>  4.4   |  24  |  1.5    Ca    8.7      2022 06:18  Mg     2.2         TPro  6.7  /  Alb  3.4<L>  /  TBili  0.5  /  DBili  x   /  AST  27  /  ALT  19  /  AlkPhos  271<H>  07-10    LIVER FUNCTIONS - ( 10 Jul 2022 01:20 )  Alb: 3.4 g/dL / Pro: 6.7 g/dL / ALK PHOS: 271 U/L / ALT: 19 U/L / AST: 27 U/L / GGT: x           PT/INR - ( 2022 06:18 )   PT: >40.00 sec;   INR: 3.59 ratio         PTT - ( 2022 06:18 )  PTT:55.3 sec    CARDIAC MARKERS ( 10 Jul 2022 12:20 )  x     / 0.01 ng/mL / x     / x     / x      CARDIAC MARKERS ( 10 Jul 2022 01:20 )  x     / 0.02 ng/mL / x     / x     / x          Serum Pro-Brain Natriuretic Peptide: 1204 pg/mL (07-10)    Urinalysis Basic - ( 10 Jul 2022 12:50 )    Color: Colorless / Appearance: Clear / S.007 / pH: x  Gluc: x / Ketone: Negative  / Bili: Negative / Urobili: <2 mg/dL   Blood: x / Protein: Trace / Nitrite: Negative   Leuk Esterase: Negative / RBC: 3 /HPF / WBC 1 /HPF   Sq Epi: x / Non Sq Epi: 1 /HPF / Bacteria: Negative      - Cultures:    Medications:  chlorhexidine 4% Liquid 1 Application(s) Topical <User Schedule>  iron sucrose IVPB 200 milliGRAM(s) IV Intermittent every 24 hours

## 2022-07-12 NOTE — DIETITIAN INITIAL EVALUATION ADULT - NSFNSNUTRCHEWSWALLOWFT_GEN_A_CORE
SLP note on 7/10 recommended NPO w/alternate means of nutrition/hydration. Pt is at high risk of aspiration. No new SLP notes documented.

## 2022-07-12 NOTE — DIETITIAN INITIAL EVALUATION ADULT - ORAL NUTRITION SUPPLEMENTS
[employer] Gelatein Plus 3X/DAILY to aid in wound healing -- will provide 480 kcal/day total, 60g pro/day total

## 2022-07-12 NOTE — DIETITIAN INITIAL EVALUATION ADULT - [facility] FEMALE
"-- DO NOT REPLY / DO NOT REPLY ALL --  -- Message is from the [employer]--    COVID-19 Universal Screening: N/A - Not about scheduling    General Patient Message      Reason for Call: patient is requesting a letter for her job stating that patient has been exposed to covid 19 prior letter was rejected by patients employer because it did not state the reason of covid 19 exposure    Caller Information       Type Contact Phone    01/04/2021 09:24 AM CST Phone (Incoming) Bernadette Reeves (Self) [phone] (M)          Alternative phone number: none    Turnaround time given to caller: ""This message will be sent to [facility] Provider's name]. The clinical team will fulfill your request as soon as they review your message. \""    " [phone]

## 2022-07-12 NOTE — PROGRESS NOTE ADULT - NS ATTEND AMEND GEN_ALL_CORE FT
Agree with above.     On my exam today, patient is much more awake and alert. She is interactive with the team, and per her aide and niece Nedra, she is mostly back to her baseline. I spoke to Nedra regarding the patient's nutritional status. I explained that Speech and Swallow would be unlikely to fully clear the patient. However, if there is no intention of placing an NGT tube and if the patient is back to baseline, then it would be reasonable to decide to feed Ms. Craven a pureed diet, understanding she has a risk of aspiration. Nedra is agreeable to Agree with above.     On my exam today, patient is much more awake and alert. She is interactive with the team, and per her aide and niece Nedra, she is mostly back to her baseline. I spoke to Nedra regarding the patient's nutritional status. I explained that Speech and Swallow would be unlikely to fully clear the patient. However, if there is no intention of placing an NGT tube and if the patient is back to baseline, then it would be reasonable to decide to feed Ms. Craven a pureed diet, understanding she has a risk of aspiration. Nedra is agreeable to feeding the patient and acknowledges there will be a risk of aspiration.     With regard to her HFpEF exacerbation, patient is no longer requiring oxygen. Will hold diuretics as Na is 147 on today's labs. CXR with persistent bilateral pleural effusions, and therefore patient will likely need oral diuretics when her sodium normalizes.     On labs, INR is uptrending as patient has been NPO. We will start to feed her today and trend her INR. If it continues to uptrend, will give oral vitamin K tomorrow.    Anticipating discharge tomorrow.

## 2022-07-12 NOTE — DIETITIAN INITIAL EVALUATION ADULT - NSFNSPHYEXAMSKINFT_GEN_A_CORE
Per flowsheet:  - suspected deep tissue injury to right 1st toe (top)  - suspected deep tissue injury to right 1st toe (side of toe)  - suspected deep tissue injury to right heel  - wound to left foot (ulcer)

## 2022-07-12 NOTE — DIETITIAN INITIAL EVALUATION ADULT - ADD RECOMMEND
1. Recommend to add DASH/TLC modular to aid in sodium control.   2. Recommend to add Prosource Gelatein Plus 3X/DAILY -- will provide 480 kcal/day total, 60g pro/day total if medically feasible  3. Recommend to supplement with MVI daily, 500 mg VIT C daily, 220 mg ZINC SULFATE (10-14 days only for zinc until wound heals) -- to aid in wound healing if medically feasible  4. Encourage PO intake and provide assistance during meals  5. Pt is at high nutrition risk; will f/u in 4 days

## 2022-07-12 NOTE — PROGRESS NOTE ADULT - ASSESSMENT
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 no previous ECHO on file. She presents with c/o SOB/Orthopnea and leg edema. Brought in by caregiver ( Nedra).      IMPRESSION/PLAN    Possible Acute on chronic HFpEF  BNP- 1204, Trop 0.02->>0.01, CXR- vascular congestion on admission  -TTE EF 69% mod enlarge left atrium, moderate MR, Mod TR, moderate AS and sever pulmonary HTN   - Repeat chest xray shows slight improvement in pleural effusion  - given patient with poor intake, lasix decreased from 40mg bid to once daily   - monitor electrolytes MG>2, K>4  -  procalcitonin normal - 0.09,   - Ferritin 87, START iron sucrose 200mg IV daily x 5 days    -titrate supplemental O2 to >92%  - obtain VBG   -strict I&O, daily weights      Chronic AFIB-(last documented afib on ekg 2018)  - currently on EKG - SB with first degree AVB, 43 bpm- HOLD Cardizem 180mg   - inr 3.59 HOLD  warfarin  - daily check pt/inr,     HTN  - VS Per routine  - cardizem on HOLD, SB 43bpm   - amlodipine 5mg ordered, unable to tolerate PO intake now, If needed based on BP reading will order Antihtn med via IV     Right Foot ulcer- arterial ulcer /pressure ulcer  -podiatry consult appreciated  -  elevated / CRP -37.2- XRAY foot negative for osteomyelitis   -  arterial doppler pending   - wound care - betadine paint right foot     Electrolyte imbalance on admission- improved     --> 137   k 5.5-->4.4  - check BMP daily    HX CVA with left sided hemiparesis  - PT eval done unable to participate  -speech and swallow done, recommended NPO vs alternate means of nutrition /hydration  - will discuss GOC with caregiver    HLD  - atorvastatin on HOLD, due to inability to tolerate PO intake     CODE STATUS: DNR/DNI        Patient is a 89 y/o female with history of chronic afib ( last documented afib on ekg 2018, on warfarin) , HTN, HLD, 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 no previous ECHO on file. She presents with c/o SOB/Orthopnea and leg edema. Brought in by caregiver ( Nedra).  Patient currently pending GOC decision from caregiver niece Nedra (NGT vs. Home with Hospice) , Patient currently NPO due to multiple failed S & S evals .     IMPRESSION/PLAN    Possible Acute on chronic HFpEF  BNP- 1204, Trop 0.02->>0.01, CXR- vascular congestion on admission  -TTE EF 69% mod enlarge left atrium, moderate MR, Mod TR, moderate AS and sever pulmonary HTN   - Repeat chest xray shows slight improvement in pleural effusion  - given patient with poor intake, lasix decreased from 40mg daily to 20mg PO daily starting tmrw 7/13  - monitor electrolytes MG>2, K>4  -  procalcitonin normal - 0.09,   - Ferritin 87, START iron sucrose 200mg IV daily x 5 days    - Continue to Wean off O2, Currently 94% on RA  - obtain VBG   -strict I&O, daily weights    #Urinary Retention  -Obtain Bladder Scan     Chronic AFIB-(last documented afib on ekg 2018)  - currently on EKG - SB with first degree AVB, 43 bpm- HOLD Cardizem 180mg   - inr 4.51 HOLD  warfarin  - daily check pt/inr,     HTN  - VS Per routine  - cardizem on HOLD, SB 43bpm   - amlodipine 5mg ordered, unable to tolerate PO intake now, If needed based on BP reading will order Antihtn med via IV     Right Foot ulcer- arterial ulcer /pressure ulcer  -podiatry consult appreciated  -  elevated / CRP -37.2- XRAY foot negative for osteomyelitis   -  arterial doppler pending   - wound care - betadine paint right foot     Electrolyte imbalance on admission- improved     --> 147   k 5.5-->4.4  Mg --> 2.0  - check BMP daily    HX CVA with left sided hemiparesis  - PT eval done unable to participate  -speech and swallow done, recommended NPO vs alternate means of nutrition /hydration  - will discuss GOC with caregiver    HLD  - atorvastatin on HOLD, due to inability to tolerate PO intake     CODE STATUS: DNR/DNI     Dispo: Aggressive alternative means of nutrition (NGT vs. Home with Hospice) pending [facility] conversation with Nedra (caregiver)    Please Contact [employer] [phone] with any questions or concerns.   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 no previous ECHO on file. She presents with c/o SOB/Orthopnea and leg edema. Brought in by caregiver ( Nedra).  Patient currently pending GOC decision from caregiver niece Nedra (NGT vs. Home with Hospice) , Patient currently NPO due to multiple failed S & S evals .     IMPRESSION/PLAN    Possible Acute on chronic HFpEF  BNP- 1204, Trop 0.02->>0.01, CXR- vascular congestion on admission  -TTE EF 69% mod enlarge left atrium, moderate MR, Mod TR, moderate AS and sever pulmonary HTN   - Repeat chest xray shows slight improvement in pleural effusion  - given patient with poor intake, lasix decreased from 40mg daily to 20mg PO daily starting tmrw 7/13  - monitor electrolytes MG>2, K>4  -  procalcitonin normal - 0.09,   - Ferritin 87, START iron sucrose 200mg IV daily x 5 days    - Continue to Wean off O2, Currently 94% on RA  - obtain VBG   -strict I&O, daily weights    #Urinary Retention  - Bladder Scan 0ml, Patient incontinent, As per nurse pt has soaked chucks and is voiding    Chronic AFIB-(last documented afib on ekg 2018)  - currently on EKG - SB with first degree AVB, 43 bpm- HOLD Cardizem 180mg   - inr 4.51 HOLD  warfarin  - daily check pt/inr     HTN  - VS Per routine  - cardizem on HOLD, SB 43bpm   - amlodipine 5mg ordered, unable to tolerate PO intake now, If needed based on BP reading will order Antihtn med via IV     Right Foot ulcer- arterial ulcer /pressure ulcer  -podiatry consult appreciated  -  elevated / CRP -37.2- XRAY foot negative for osteomyelitis   -  arterial doppler pending   - wound care - betadine paint right foot     Electrolyte imbalance on admission- improved     --> 147   k 5.5-->4.4  Mg --> 2.0  - check BMP daily    HX CVA with left sided hemiparesis  - PT eval done unable to participate  -speech and swallow done, recommended NPO vs alternate means of nutrition /hydration  - will discuss GOC with caregiver    HLD  - atorvastatin on HOLD, due to inability to tolerate PO intake     CODE STATUS: DNR/DNI     Dispo: Aggressive alternative means of nutrition (NGT vs. Home with Hospice) pending GOC conversation with Nedra (caregiver)    Please Contact [employer] [phone] with any questions or concerns.   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 no previous ECHO on file. She presents with c/o SOB/Orthopnea and leg edema. Brought in by caregiver ( Nedra).  Patient currently pending [facility] decision from caregiver niece Nedra (NGT vs. Home with Hospice) , Patient currently NPO due to multiple failed S & S evals .     IMPRESSION/PLAN    Possible Acute on chronic HFpEF  BNP- 1204, Trop 0.02->>0.01, CXR- vascular congestion on admission  -TTE EF 69% mod enlarge left atrium, moderate MR, Mod TR, moderate AS and sever pulmonary HTN   - Repeat chest xray shows slight improvement in pleural effusion  - given patient with poor intake, lasix decreased from 40mg daily to 20mg PO daily to start  Thursday 7/14  - monitor electrolytes MG>2, K>4  -  procalcitonin normal - 0.09,   - Ferritin 87, START iron sucrose 200mg IV daily x 5 days    - Continue to Wean off O2, Currently 94% on RA  - obtain VBG   -strict I&O, daily weights    #Urinary Retention  - Bladder Scan 0ml, Patient incontinent, As per nurse pt has soaked chucks and is voiding    Chronic AFIB-(last documented afib on ekg 2018)  - currently on EKG - SB with first degree AVB, 43 bpm- HOLD Cardizem 180mg   - inr 4.51 HOLD  warfarin, consider giving PO Vitamin K tomorrow   - daily check pt/inr     HTN  - VS Per routine  - cardizem on HOLD, SB 43bpm   - amlodipine 5mg ordered, unable to tolerate PO intake now, If needed based on BP reading will order Antihtn med via IV     Right Foot ulcer- arterial ulcer /pressure ulcer  -podiatry consult appreciated  -  elevated / CRP -37.2- XRAY foot negative for osteomyelitis   -  arterial doppler pending   - wound care - betadine paint right foot     Electrolyte imbalance on admission- improved     --> 147   k 5.5-->4.4  Mg --> 2.0  - check BMP daily    HX CVA with left sided hemiparesis  - PT eval done unable to participate  - Diet changed to Pureed consistency      HLD  - atorvastatin on HOLD, due to inability to tolerate PO intake     CODE STATUS: DNR/DNI     Dispo: Aggressive alternative means of nutrition (NGT vs. Home with Hospice) pending [facility] conversation with Nedra (caregiver)    Please Contact [email or web address] [phone] with any questions or concerns.   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 no previous ECHO on file. She presents with c/o SOB/Orthopnea and leg edema. Brought in by caregiver ( Nedra).  Patient currently pending [facility] decision from caregiver niece Nedra (NGT vs. Home with Hospice) , Patient currently NPO due to multiple failed S & S evals .     IMPRESSION/PLAN    Possible Acute on chronic HFpEF  BNP- 1204, Trop 0.02->>0.01, CXR- vascular congestion on admission  -TTE EF 69% mod enlarge left atrium, moderate MR, Mod TR, moderate AS and sever pulmonary HTN   - Repeat chest xray shows slight improvement in pleural effusion  - given patient with poor intake, lasix decreased from 40mg daily to 20mg PO daily to start Thursday 7/14  - monitor electrolytes MG>2, K>4  -  procalcitonin normal - 0.09,   - Ferritin 87, START iron sucrose 200mg IV daily x 5 days    - Continue to Wean off O2, Currently 94% on RA  - obtain VBG   -strict I&O, daily weights    #Urinary Retention  - Bladder Scan 0ml, Patient incontinent, As per nurse pt has soaked chucks and is voiding    Chronic AFIB-(last documented afib on ekg 2018)  - currently on EKG - SB with first degree AVB, 43 bpm- HOLD Cardizem 180mg   - inr 4.51 HOLD  warfarin, consider giving PO Vitamin K tomorrow   - daily check pt/inr     HTN  - VS Per routine  - cardizem on HOLD, SB 43bpm   - amlodipine 5mg ordered, unable to tolerate PO intake now, If needed based on BP reading will order Antihtn med via IV     Right Foot ulcer- arterial ulcer /pressure ulcer  -podiatry consult appreciated  -  elevated / CRP -37.2- XRAY foot negative for osteomyelitis   -  arterial doppler pending   - wound care - betadine paint right foot     Electrolyte imbalance on admission- improved     --> 147   k 5.5-->4.4  Mg --> 2.0  - check BMP daily    HX CVA with left sided hemiparesis  - PT eval done unable to participate  - Diet changed to Pureed consistency      HLD  - atorvastatin on HOLD, due to inability to tolerate PO intake     CODE STATUS: DNR/DNI     Dispo: Aggressive alternative means of nutrition (NGT vs. Home with Hospice) pending [facility] conversation with Nedra (caregiver)    Please Contact [employer] [phone] with any questions or concerns.   Aniyah Wynne NP  Cardiology

## 2022-07-12 NOTE — DIETITIAN INITIAL EVALUATION ADULT - PERTINENT MEDS FT
MEDICATIONS  (STANDING):  chlorhexidine 4% Liquid 1 Application(s) Topical <User Schedule>  iron sucrose IVPB 200 milliGRAM(s) IV Intermittent every 24 hours    MEDICATIONS  (PRN):  acetaminophen     Tablet .. 650 milliGRAM(s) Oral every 6 hours PRN Mild Pain (1 - 3)  ALBUTerol   0.5% 2.5 milliGRAM(s) Nebulizer every 4 hours PRN Wheezing  melatonin 3 milliGRAM(s) Oral at bedtime PRN Insomnia

## 2022-07-12 NOTE — DIETITIAN INITIAL EVALUATION ADULT - ORAL INTAKE PTA/DIET HISTORY
Pt mostly nonverbal and confused per flowsheet. Spoke to aid at bedside. Per aid, pt had a good appetite and consumed 3 meals daily; usually pureed food and occasionally half of a sandwich. Pt also consumed snacks throughout the day and before taking medication. PTA, pt pocketed food in mouth often; no issues swallowing. Per aid, pt only has teeth in front; all real teeth and no dentures. Denies taking protein shake supplements. Aid does not recall pt taking vitamins or minerals. NKFA; does not have any restrictive cultural/[place] food preferences.

## 2022-07-12 NOTE — DIETITIAN INITIAL EVALUATION ADULT - OTHER INFO
Pt previously NPO; diet recently upgraded this afternoon on 7/12 to pureed. Will monitor tolerance to texture and if pt has swallowing/chewing difficulties.     Weight hx: Aid reported UBW possibly 56.8 KG (125#) - taken 3 months ago; not 100% sure. Current dosing weight is 62 KG. Previous admission weight was  Pt previously NPO; diet recently upgraded this afternoon on 7/12 to pureed. Will monitor tolerance to texture and if pt has swallowing/chewing difficulties.     Weight hx: Aid reported UBW possibly 56.8 KG (125#) - taken 3 months ago; not 100% sure. Current dosing weight is 62 KG; 8.38% unintentional weight gain in 3 months compared to 56.8 KG.

## 2022-07-12 NOTE — DIETITIAN INITIAL EVALUATION ADULT - NAME AND PHONE
Aliza [phone]    Nutrition Intervention: meals and medical food supplements; Nutrition Monitoring: Diet order, PO intake, weights, labs, NFPF, body composition, BM and tolerance to medical food supplements

## 2022-07-12 NOTE — ED ADULT NURSE NOTE - OBJECTIVE STATEMENT
c.o sob when lying down,  has hisotry of stroke on R side. Airway patent with neg distress noted. aorm x 4 extrem. Denies any chest pain or palpitations. Family at bedsdie. Cardiogenic shock

## 2022-07-12 NOTE — DIETITIAN INITIAL EVALUATION ADULT - OTHER CALCULATIONS
Using ABW 62 KG: ENERGY: 8592-6845 kcal/day (MSJ 1.2-1.4 AF); PROTEIN: 74-93 g/day (1.2-1.5 g/kg); FLUID: 1550 mL/day (25 mL/kg) or per LIP -- with consideration for fluid overload, multiple suspected DTI, age

## 2022-07-12 NOTE — DIETITIAN INITIAL EVALUATION ADULT - EDUCATION DIETARY MODIFICATIONS
Discussed importance of PO intake, medical food supplements, energy needs and wounds./(1) partially meets; needs review/practice/verbalization

## 2022-07-13 LAB
ALBUMIN SERPL ELPH-MCNC: 3 G/DL — LOW (ref 3.5–5.2)
ALP SERPL-CCNC: 237 U/L — HIGH (ref 30–115)
ALT FLD-CCNC: 23 U/L — SIGNIFICANT CHANGE UP (ref 0–41)
ANION GAP SERPL CALC-SCNC: 12 MMOL/L — SIGNIFICANT CHANGE UP (ref 7–14)
APTT BLD: 52.8 SEC — HIGH (ref 27–39.2)
AST SERPL-CCNC: 37 U/L — SIGNIFICANT CHANGE UP (ref 0–41)
BILIRUB SERPL-MCNC: 0.6 MG/DL — SIGNIFICANT CHANGE UP (ref 0.2–1.2)
BUN SERPL-MCNC: 31 MG/DL — HIGH (ref 10–20)
CALCIUM SERPL-MCNC: 8.9 MG/DL — SIGNIFICANT CHANGE UP (ref 8.5–10.1)
CHLORIDE SERPL-SCNC: 105 MMOL/L — SIGNIFICANT CHANGE UP (ref 98–110)
CO2 SERPL-SCNC: 26 MMOL/L — SIGNIFICANT CHANGE UP (ref 17–32)
CREAT SERPL-MCNC: 1.4 MG/DL — SIGNIFICANT CHANGE UP (ref 0.7–1.5)
EGFR: 36 ML/MIN/1.73M2 — LOW
GLUCOSE SERPL-MCNC: 85 MG/DL — SIGNIFICANT CHANGE UP (ref 70–99)
HCT VFR BLD CALC: 30.7 % — LOW (ref 37–47)
HGB BLD-MCNC: 10 G/DL — LOW (ref 12–16)
INR BLD: 2.94 RATIO — HIGH (ref 0.65–1.3)
MAGNESIUM SERPL-MCNC: 2 MG/DL — SIGNIFICANT CHANGE UP (ref 1.8–2.4)
MCHC RBC-ENTMCNC: 32.6 G/DL — SIGNIFICANT CHANGE UP (ref 32–37)
MCHC RBC-ENTMCNC: 34.7 PG — HIGH (ref 27–31)
MCV RBC AUTO: 106.6 FL — HIGH (ref 81–99)
NRBC # BLD: 0 /100 WBCS — SIGNIFICANT CHANGE UP (ref 0–0)
PLATELET # BLD AUTO: 146 K/UL — SIGNIFICANT CHANGE UP (ref 130–400)
POTASSIUM SERPL-MCNC: 4.9 MMOL/L — SIGNIFICANT CHANGE UP (ref 3.5–5)
POTASSIUM SERPL-SCNC: 4.9 MMOL/L — SIGNIFICANT CHANGE UP (ref 3.5–5)
PROT SERPL-MCNC: 6.2 G/DL — SIGNIFICANT CHANGE UP (ref 6–8)
PROTHROM AB SERPL-ACNC: 33.5 SEC — HIGH (ref 9.95–12.87)
RBC # BLD: 2.88 M/UL — LOW (ref 4.2–5.4)
RBC # FLD: 15.9 % — HIGH (ref 11.5–14.5)
SODIUM SERPL-SCNC: 143 MMOL/L — SIGNIFICANT CHANGE UP (ref 135–146)
WBC # BLD: 8.5 K/UL — SIGNIFICANT CHANGE UP (ref 4.8–10.8)
WBC # FLD AUTO: 8.5 K/UL — SIGNIFICANT CHANGE UP (ref 4.8–10.8)

## 2022-07-13 PROCEDURE — 99233 SBSQ HOSP IP/OBS HIGH 50: CPT

## 2022-07-13 RX ORDER — ZINC SULFATE TAB 220 MG (50 MG ZINC EQUIVALENT) 220 (50 ZN) MG
220 TAB ORAL DAILY
Refills: 0 | Status: DISCONTINUED | OUTPATIENT
Start: 2022-07-14 | End: 2022-07-14

## 2022-07-13 RX ORDER — AMLODIPINE BESYLATE 2.5 MG/1
2.5 TABLET ORAL DAILY
Refills: 0 | Status: DISCONTINUED | OUTPATIENT
Start: 2022-07-14 | End: 2022-07-14

## 2022-07-13 RX ORDER — ASCORBIC ACID 60 MG
500 TABLET,CHEWABLE ORAL DAILY
Refills: 0 | Status: DISCONTINUED | OUTPATIENT
Start: 2022-07-13 | End: 2022-07-14

## 2022-07-13 RX ORDER — WARFARIN SODIUM 2.5 MG/1
2 TABLET ORAL ONCE
Refills: 0 | Status: COMPLETED | OUTPATIENT
Start: 2022-07-13 | End: 2022-07-13

## 2022-07-13 RX ADMIN — IRON SUCROSE 110 MILLIGRAM(S): 20 INJECTION, SOLUTION INTRAVENOUS at 17:52

## 2022-07-13 RX ADMIN — WARFARIN SODIUM 2 MILLIGRAM(S): 2.5 TABLET ORAL at 21:32

## 2022-07-13 RX ADMIN — CHLORHEXIDINE GLUCONATE 1 APPLICATION(S): 213 SOLUTION TOPICAL at 07:07

## 2022-07-13 NOTE — PROGRESS NOTE ADULT - TIME BILLING
Bedside evaluation and exam in AM  Two phone call with Nedra  Care coordination with Case Mgmt throughout the day  Bedside evaluation in PM
Bedside evaluation and exam  Update of family member Nedra and goals of care discussion  Care coordination with Case Management

## 2022-07-13 NOTE — PROGRESS NOTE ADULT - SUBJECTIVE AND OBJECTIVE BOX
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 no previous ECHO on file. She presents with c/o SOB/Orthopnea and leg edema. Brought in by caregiver ( Nedra).    Patient currently admitted to cardiology telemetry. Found to have pulmonary edema on exam and CXR, along with elevated BNP. Possible previous history of HFpEF, but no documented TTE. Possible acute on chronic HFpEF exacerbation. IV diuresis.  Mild troponemia which may be due to demand. Tele shows sinus bradycardia and EKG nonischemic.  Patient seen and assessed at bedside, denies cp, SOB, palpitations. Afib rate controlled on telemetry.    Review of systems: A complete 10-point review of systems was obtained and is negative except as stated in the interval history.    Vitals:  T(F): 97, Max: 97.8 (07-13 @ 05:00)  HR: 79 (78 - 87)  BP: 144/65 (144/65 - 162/71)  RR: 18 (18 - 18)  SpO2: 92% (92% - 97%)    Ins & outs:     07-10 @ 07:01  -  07-11 @ 07:00  --------------------------------------------------------  IN: 0 mL / OUT: 1880 mL / NET: -1880 mL    07-11 @ 07:01  -  07-12 @ 07:00  --------------------------------------------------------  IN: 0 mL / OUT: 950 mL / NET: -950 mL    07-12 @ 07:01  -  07-13 @ 07:00  --------------------------------------------------------  IN: 180 mL / OUT: 655 mL / NET: -475 mL    07-13 @ 07:01  - 07-13 @ 15:39  --------------------------------------------------------  IN: 0 mL / OUT: 0 mL / NET: 0 mL      Weight trend:  Weight (kg): 62 (07-10      Physical exam:  General: No apparent distress  HEENT: Anicteric sclera. Moist mucous membranes.   Cardiac: Regular rate and rhythm. No murmurs, rubs, or gallops.   Vascular: Symmetric radial pulses. Dorsalis pedis pulses palpable.   Respiratory: Normal effort. Bibasilar crackles  Abdomen: Soft, nontender. Audible bowel sounds.   Extremities: Warm with b/l +1 edema. No cyanosis or clubbing.   Skin: right foot ulcer, Warm and dry. No rash.   Neurologic: Grossly normal motor function.   Psychiatric: Euthymic. Aphasic     Data reviewed:  - Telemetry: sinus rhythm     - ECG < from: 12 Lead ECG (07.10.22 @ 01:18) >  sinus bradycardia with 1st degree A-V block  Right bundle branch block  Septal infarct , age undetermined  Abnormal ECG    < end of copied text >    - Echo  < from: TTE Echo Complete w/o Contrast w/ Doppler (07.10.22 @ 08:00) >    Summary:   1. LV Ejection Fraction by Gamble's Method with a biplane EF of 69 %.   2. Elevated mean left atrial pressure.   3. Spectral Doppler shows pseudonormal pattern of left ventricular   myocardial filling (Grade II diastolic dysfunction).   4. Moderately enlarged left atrium.   5. Normal right atrial size.   6. Moderate mitral valve regurgitation.   7. Moderate tricuspid regurgitation.   8. Mild aortic regurgitation.   9. Moderate aortic valve stenosis.  10. Moderate pulmonic valve regurgitation.  11. Estimated pulmonary artery systolic pressure is 80.1 mmHg assuming a   right atrial pressure of 3 mmHg, which is consistent with severe   pulmonary hypertension.    < end of copied text >    - Radiology:  < from: Xray Chest 1 View AP/PA (07.10.22 @ 14:16) >  Impression:    1. Slightly decreased bibasilar opacities/pleural effusions.    < end of copied text >     Xray Foot AP + Lateral, Right (07.10.22 @ 14:19) >    Osteopenia.Apparent ulcer along the plantar aspect of the first MTP   joint. Nonspecific cystic/erosive change of the sesamoid may be   degenerative versus osteomyelitis, close follow-up recommended.   Additional ulcer along the posterior calcaneus and questionably along the   tip of the first toe. No definite radiographic evidence for osteomyelitis   in these regions.    Degenerative change in the midfoot. Calcaneal heel spur. Vascular   calcifications.        - Labs:                        10.0   8.50  )-----------( 146      ( 13 Jul 2022 05:25 )             30.7     07-13    143  |  105  |  31<H>  ----------------------------<  85  4.9   |  26  |  1.4    Ca    8.9      13 Jul 2022 05:25  Mg     2.0     07-13    TPro  6.2  /  Alb  3.0<L>  /  TBili  0.6  /  DBili  x   /  AST  37  /  ALT  23  /  AlkPhos  237<H>  07-13    PT/INR - ( 13 Jul 2022 05:25 )   PT: 33.50 sec;   INR: 2.94 ratio         PTT - ( 13 Jul 2022 05:25 )  PTT:52.8 sec  Troponin T, Serum: 0.01 ng/mL (07-10-22 @ 12:20)  Troponin T, Serum: 0.02 ng/mL (07-10-22 @ 01:20)    Serum Pro-Brain Natriuretic Peptide: 1204 pg/mL (07-10)        Thyroid Stimulating Hormone, Serum: 4.69 uIU/mL (07-10-22 @ 12:20)        Medications:  chlorhexidine 4% Liquid 1 Application(s) Topical <User Schedule>  iron sucrose IVPB 200 milliGRAM(s) IV Intermittent every 24 hours  warfarin 2 milliGRAM(s) Oral once    Drips:    PRN:     Allergies    penicillin (Unknown)    Intolerances      Assessment:  Patient is a 89 y/o female with history of chronic afib ( last documented afib on ekg 2018, on warfarin) , HTN, HLD, 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 no previous ECHO on file. She presents with c/o SOB/Orthopnea and leg edema. Brought in by caregiver ( Nedra).  Patient currently pending [facility] decision from caregiver niece Nedra (NGT vs. Home with Hospice) , Patient currently NPO due to multiple failed S & S evals .     IMPRESSION/PLAN    Possible Acute on chronic HFpEF  BNP- 1204, Trop 0.02->>0.01, CXR- vascular congestion on admission  -TTE EF 69% mod enlarge left atrium, moderate MR, Mod TR, moderate AS and sever pulmonary HTN   - Repeat chest xray shows slight improvement in pleural effusion  - given patient with poor intake, lasix decreased from 40mg daily to 20mg PO daily to start Thursday 7/14  - monitor electrolytes MG>2, K>4  -  procalcitonin normal - 0.09,   - Ferritin 87, START iron sucrose 200mg IV daily x 5 days    - Currently 94% on RA, weaned off o2  -strict I&O, daily weights    #Urinary Retention  - Bladder Scan 7/12 0ml, Patient incontinent, As per nurse pt has soaked chucks and is voiding    Chronic AFIB-(last documented afib on ekg 2018)  - currently on EKG - SB with first degree AVB, 43 bpm- HOLD Cardizem 180mg   - INR 2.94 Restart warfarin tonight  - daily check pt/inr     HTN  - VS Per routine  - cardizem on HOLD, SB 43bpm   - amlodipine 5mg ordered, If needed based on BP reading will order Antihtn med via IV     Right Foot ulcer- arterial ulcer /pressure ulcer  -podiatry consult appreciated  -  elevated / CRP -37.2- XRAY foot negative for osteomyelitis   -  arterial doppler pending   - wound care - betadine paint right foot     Electrolyte imbalance on admission- improved       k 4.9  Mg 2.0  - check BMP daily    HX CVA with left sided hemiparesis  - PT eval done unable to participate  - Diet changed to Pureed consistency      HLD  - atorvastatin on HOLD    CODE STATUS: DNR/DNI     Dispo: DC Home Tomorrow with 24 hour Home health Aide services    Please Contact [phone] with any questions or concerns.   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 no previous ECHO on file. She presents with c/o SOB/Orthopnea and leg edema. Brought in by caregiver ( Nedra).    Patient currently admitted to cardiology telemetry. Found to have pulmonary edema on exam and CXR, along with elevated BNP. Possible previous history of HFpEF, but no documented TTE. Possible acute on chronic HFpEF exacerbation. IV diuresis.  Mild troponemia which may be due to demand. Tele shows sinus bradycardia and EKG nonischemic.  Patient seen and assessed at bedside, denies cp, SOB, palpitations. Afib rate controlled on telemetry.    Review of systems: A complete 10-point review of systems was obtained and is negative except as stated in the interval history.    Vitals:  T(F): 97, Max: 97.8 (07-13 @ 05:00)  HR: 79 (78 - 87)  BP: 144/65 (144/65 - 162/71)  RR: 18 (18 - 18)  SpO2: 92% (92% - 97%)    Ins & outs:     07-10 @ 07:01  -  07-11 @ 07:00  --------------------------------------------------------  IN: 0 mL / OUT: 1880 mL / NET: -1880 mL    07-11 @ 07:01  -  07-12 @ 07:00  --------------------------------------------------------  IN: 0 mL / OUT: 950 mL / NET: -950 mL    07-12 @ 07:01  -  07-13 @ 07:00  --------------------------------------------------------  IN: 180 mL / OUT: 655 mL / NET: -475 mL    07-13 @ 07:01  - 07-13 @ 15:39  --------------------------------------------------------  IN: 0 mL / OUT: 0 mL / NET: 0 mL      Weight trend:  Weight (kg): 62 (07-10      Physical exam:  General: No apparent distress  HEENT: Anicteric sclera. Moist mucous membranes.   Cardiac: Regular rate and rhythm. No murmurs, rubs, or gallops.   Vascular: Symmetric radial pulses. Dorsalis pedis pulses palpable.   Respiratory: Normal effort. Bibasilar crackles  Abdomen: Soft, nontender. Audible bowel sounds.   Extremities: Warm with b/l +1 edema. No cyanosis or clubbing.   Skin: right foot ulcer, Warm and dry. No rash.   Neurologic: Grossly normal motor function.   Psychiatric: Euthymic. Aphasic     Data reviewed:  - Telemetry: sinus rhythm     - ECG < from: 12 Lead ECG (07.10.22 @ 01:18) >  sinus bradycardia with 1st degree A-V block  Right bundle branch block  Septal infarct , age undetermined  Abnormal ECG    < end of copied text >    - Echo  < from: TTE Echo Complete w/o Contrast w/ Doppler (07.10.22 @ 08:00) >    Summary:   1. LV Ejection Fraction by Gamble's Method with a biplane EF of 69 %.   2. Elevated mean left atrial pressure.   3. Spectral Doppler shows pseudonormal pattern of left ventricular   myocardial filling (Grade II diastolic dysfunction).   4. Moderately enlarged left atrium.   5. Normal right atrial size.   6. Moderate mitral valve regurgitation.   7. Moderate tricuspid regurgitation.   8. Mild aortic regurgitation.   9. Moderate aortic valve stenosis.  10. Moderate pulmonic valve regurgitation.  11. Estimated pulmonary artery systolic pressure is 80.1 mmHg assuming a   right atrial pressure of 3 mmHg, which is consistent with severe   pulmonary hypertension.    < end of copied text >    - Radiology:  < from: Xray Chest 1 View AP/PA (07.10.22 @ 14:16) >  Impression:    1. Slightly decreased bibasilar opacities/pleural effusions.    < end of copied text >     Xray Foot AP + Lateral, Right (07.10.22 @ 14:19) >    Osteopenia.Apparent ulcer along the plantar aspect of the first MTP   joint. Nonspecific cystic/erosive change of the sesamoid may be   degenerative versus osteomyelitis, close follow-up recommended.   Additional ulcer along the posterior calcaneus and questionably along the   tip of the first toe. No definite radiographic evidence for osteomyelitis   in these regions.    Degenerative change in the midfoot. Calcaneal heel spur. Vascular   calcifications.        - Labs:                        10.0   8.50  )-----------( 146      ( 13 Jul 2022 05:25 )             30.7     07-13    143  |  105  |  31<H>  ----------------------------<  85  4.9   |  26  |  1.4    Ca    8.9      13 Jul 2022 05:25  Mg     2.0     07-13    TPro  6.2  /  Alb  3.0<L>  /  TBili  0.6  /  DBili  x   /  AST  37  /  ALT  23  /  AlkPhos  237<H>  07-13    PT/INR - ( 13 Jul 2022 05:25 )   PT: 33.50 sec;   INR: 2.94 ratio         PTT - ( 13 Jul 2022 05:25 )  PTT:52.8 sec  Troponin T, Serum: 0.01 ng/mL (07-10-22 @ 12:20)  Troponin T, Serum: 0.02 ng/mL (07-10-22 @ 01:20)    Serum Pro-Brain Natriuretic Peptide: 1204 pg/mL (07-10)        Thyroid Stimulating Hormone, Serum: 4.69 uIU/mL (07-10-22 @ 12:20)        Medications:  chlorhexidine 4% Liquid 1 Application(s) Topical <User Schedule>  iron sucrose IVPB 200 milliGRAM(s) IV Intermittent every 24 hours  warfarin 2 milliGRAM(s) Oral once    Drips:    PRN:     Allergies    penicillin (Unknown)    Intolerances      Assessment:  Patient is a 89 y/o female with history of chronic afib ( last documented afib on ekg 2018, on warfarin) , HTN, HLD, 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 no previous ECHO on file. She presents with c/o SOB/Orthopnea and leg edema. Brought in by caregiver ( Nedra).  Patient currently pending [facility] decision from caregiver niece Nedra (NGT vs. Home with Hospice) , Patient currently NPO due to multiple failed S & S evals .     IMPRESSION/PLAN    Possible Acute on chronic HFpEF  BNP- 1204, Trop 0.02->>0.01, CXR- vascular congestion on admission  -TTE EF 69% mod enlarge left atrium, moderate MR, Mod TR, moderate AS and sever pulmonary HTN   - Repeat chest xray shows slight improvement in pleural effusion  - given O2 sat has improved, no need for IV lasix, will start lasix 20 mg PO PRN decreased urine output or dyspnea  - monitor electrolytes MG>2, K>4  -  procalcitonin normal - 0.09  - Ferritin 87, START iron sucrose 200mg IV daily x 5 days    - Currently 94% on RA, weaned off o2  -strict I&O, daily weights    #Urinary Retention  - Bladder Scan 7/12 0ml, Patient incontinent, As per nurse pt has soaked chucks and is voiding    Paroxysmal AFIB-(last documented afib on ekg 2018)  - Initial ECG showed SB with first degree AVB, 43 bpm- HOLD Cardizem 180mg   - On telemetry, now in Afib  - rate controlled without medications, therefore no need for cardizem  - INR 2.94 Restart warfarin 2mg 3x per week   - daily check pt/inr     HTN  - VS Per routine  - cardizem on HOLD, SB 43bpm   - will start amlodipine 2.5 mg daily tomorrow    Right Foot ulcer- arterial ulcer /pressure ulcer  -podiatry consult appreciated  -  elevated / CRP -37.2- XRAY foot negative for osteomyelitis   -  arterial doppler pending   - wound care - betadine paint right foot     Electrolyte imbalance on admission- improved       k 4.9  Mg 2.0  - check BMP daily    HX CVA with left sided hemiparesis  - PT eval done unable to participate  - Diet changed to Pureed consistency      HLD  - atorvastatin on HOLD    CODE STATUS: DNR/DNI     Dispo: DC Home Tomorrow with 24 hour Home health Aide services    Please Contact [facility] [phone] with any questions or concerns.   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 no previous ECHO on file. She presents with c/o SOB/Orthopnea and leg edema. Brought in by caregiver ( Nedra).    Patient currently admitted to cardiology telemetry. Found to have pulmonary edema on exam and CXR, along with elevated BNP. Possible previous history of HFpEF, but no documented TTE. Possible acute on chronic HFpEF exacerbation. IV diuresis.  Mild troponemia which may be due to demand. Tele shows sinus bradycardia and EKG nonischemic.  Patient seen and assessed at bedside, denies cp, SOB, palpitations. Afib rate controlled on telemetry.    Review of systems: A complete 10-point review of systems was obtained and is negative except as stated in the interval history.    Vitals:  T(F): 97, Max: 97.8 (07-13 @ 05:00)  HR: 79 (78 - 87)  BP: 144/65 (144/65 - 162/71)  RR: 18 (18 - 18)  SpO2: 92% (92% - 97%)    Ins & outs:     07-10 @ 07:01  -  07-11 @ 07:00  --------------------------------------------------------  IN: 0 mL / OUT: 1880 mL / NET: -1880 mL    07-11 @ 07:01  -  07-12 @ 07:00  --------------------------------------------------------  IN: 0 mL / OUT: 950 mL / NET: -950 mL    07-12 @ 07:01  -  07-13 @ 07:00  --------------------------------------------------------  IN: 180 mL / OUT: 655 mL / NET: -475 mL    07-13 @ 07:01  - 07-13 @ 15:39  --------------------------------------------------------  IN: 0 mL / OUT: 0 mL / NET: 0 mL      Weight trend:  Weight (kg): 62 (07-10      Physical exam:  General: No apparent distress  HEENT: Anicteric sclera. Moist mucous membranes.   Cardiac: Regular rate and rhythm. No murmurs, rubs, or gallops.   Vascular: Symmetric radial pulses. Dorsalis pedis pulses palpable.   Respiratory: Normal effort. Bibasilar crackles  Abdomen: Soft, nontender. Audible bowel sounds.   Extremities: Warm with b/l +1 edema. No cyanosis or clubbing.   Skin: right foot ulcer, Warm and dry. No rash.   Neurologic: Grossly normal motor function.   Psychiatric: Euthymic. Aphasic     Data reviewed:  - Telemetry: sinus rhythm     - ECG < from: 12 Lead ECG (07.10.22 @ 01:18) >  sinus bradycardia with 1st degree A-V block  Right bundle branch block  Septal infarct , age undetermined  Abnormal ECG    < end of copied text >    - Echo  < from: TTE Echo Complete w/o Contrast w/ Doppler (07.10.22 @ 08:00) >    Summary:   1. LV Ejection Fraction by Gamble's Method with a biplane EF of 69 %.   2. Elevated mean left atrial pressure.   3. Spectral Doppler shows pseudonormal pattern of left ventricular   myocardial filling (Grade II diastolic dysfunction).   4. Moderately enlarged left atrium.   5. Normal right atrial size.   6. Moderate mitral valve regurgitation.   7. Moderate tricuspid regurgitation.   8. Mild aortic regurgitation.   9. Moderate aortic valve stenosis.  10. Moderate pulmonic valve regurgitation.  11. Estimated pulmonary artery systolic pressure is 80.1 mmHg assuming a   right atrial pressure of 3 mmHg, which is consistent with severe   pulmonary hypertension.    < end of copied text >    - Radiology:  < from: Xray Chest 1 View AP/PA (07.10.22 @ 14:16) >  Impression:    1. Slightly decreased bibasilar opacities/pleural effusions.    < end of copied text >     Xray Foot AP + Lateral, Right (07.10.22 @ 14:19) >    Osteopenia.Apparent ulcer along the plantar aspect of the first MTP   joint. Nonspecific cystic/erosive change of the sesamoid may be   degenerative versus osteomyelitis, close follow-up recommended.   Additional ulcer along the posterior calcaneus and questionably along the   tip of the first toe. No definite radiographic evidence for osteomyelitis   in these regions.    Degenerative change in the midfoot. Calcaneal heel spur. Vascular   calcifications.        - Labs:                        10.0   8.50  )-----------( 146      ( 13 Jul 2022 05:25 )             30.7     07-13    143  |  105  |  31<H>  ----------------------------<  85  4.9   |  26  |  1.4    Ca    8.9      13 Jul 2022 05:25  Mg     2.0     07-13    TPro  6.2  /  Alb  3.0<L>  /  TBili  0.6  /  DBili  x   /  AST  37  /  ALT  23  /  AlkPhos  237<H>  07-13    PT/INR - ( 13 Jul 2022 05:25 )   PT: 33.50 sec;   INR: 2.94 ratio         PTT - ( 13 Jul 2022 05:25 )  PTT:52.8 sec  Troponin T, Serum: 0.01 ng/mL (07-10-22 @ 12:20)  Troponin T, Serum: 0.02 ng/mL (07-10-22 @ 01:20)    Serum Pro-Brain Natriuretic Peptide: 1204 pg/mL (07-10)        Thyroid Stimulating Hormone, Serum: 4.69 uIU/mL (07-10-22 @ 12:20)        Medications:  chlorhexidine 4% Liquid 1 Application(s) Topical <User Schedule>  iron sucrose IVPB 200 milliGRAM(s) IV Intermittent every 24 hours  warfarin 2 milliGRAM(s) Oral once    Drips:    PRN:     Allergies    penicillin (Unknown)    Intolerances      Assessment:  Patient is a 89 y/o female with history of chronic afib ( last documented afib on ekg 2018, on warfarin) , HTN, HLD, 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 no previous ECHO on file. She presents with c/o SOB/Orthopnea and leg edema. Brought in by caregiver ( Nedra).  Patient currently pending [facility] decision from caregiver niece Nedra (NGT vs. Home with Hospice) , Patient currently NPO due to multiple failed S & S evals .     IMPRESSION/PLAN    #Possible Acute on chronic HFpEF  BNP- 1204, Trop 0.02->>0.01, CXR- vascular congestion on admission  -TTE EF 69% mod enlarge left atrium, moderate MR, Mod TR, moderate AS and sever pulmonary HTN   - Repeat chest xray shows slight improvement in pleural effusion  - given O2 sat has improved, no need for IV lasix, will start lasix 20 mg PO PRN decreased urine output or dyspnea  - monitor electrolytes MG>2, K>4  -  procalcitonin normal - 0.09  - Ferritin 87, START iron sucrose 200mg IV daily x 5 days    - Currently 94% on RA, weaned off o2  -strict I&O, daily weights    #Urinary Retention  - Bladder Scan 7/12 0ml, Patient incontinent, As per nurse pt has soaked chucks and is voiding    #Paroxysmal AFIB-(last documented afib on ekg 2018)  - Initial ECG showed SB with first degree AVB, 43 bpm- HOLD Cardizem 180mg   - On telemetry, now in Afib  - rate controlled without medications, therefore no need for cardizem  - INR 2.94 Restart warfarin 2mg 3x per week   - daily check pt/inr     #HTN  - VS Per routine  - cardizem on HOLD, SB 43bpm   - will start amlodipine 2.5 mg daily tomorrow    #Right Foot ulcer- arterial ulcer /pressure ulcer  -podiatry consult appreciated  -  elevated / CRP -37.2- XRAY foot negative for osteomyelitis   -  arterial doppler pending   - wound care - betadine paint right foot     #Electrolyte imbalance on admission- improved       k 4.9  Mg 2.0  - check BMP daily    #HX CVA with left sided hemiparesis  - PT eval done unable to participate  - Diet changed to Pureed consistency      #HLD  - atorvastatin on HOLD    #Nutrition Recs as per RD consult:    Aliza [phone]  Nutrition Intervention: meals and medical food supplements; Nutrition Monitoring: Diet order, PO intake, weights, labs, NFPF, body composition, BM and tolerance to medical food supplements  1. Recommend to add DASH/TLC modular to aid in sodium control.   2. Recommend to add Prosource Gelatein Plus 3X/DAILY -- will provide 480 kcal/day total, 60g pro/day total if medically feasible  3. Recommend to supplement with MVI daily, 500 mg VIT C daily, 220 mg ZINC SULFATE (10-14 days only for zinc until wound heals) -- to aid in wound healing if medically feasible  4. Encourage PO intake and provide assistance during meals  5. Pt is at high nutrition risk; will f/u in 4 days  caregiver    CODE STATUS: DNR/DNI     Dispo: DC Home Tomorrow with 24 hour Home health Aide services    Please Contact [facility] [phone] with any questions or concerns.   Aniyah Wynne NP

## 2022-07-13 NOTE — PROGRESS NOTE ADULT - NS ATTEND AMEND GEN_ALL_CORE FT
Agree with above. Spoke to jolene Sneed twice today for discharge planning. Patient will be sent home tomorrow and Rx written so patient can be evaluated for 24 hour home services.

## 2022-07-14 VITALS — DIASTOLIC BLOOD PRESSURE: 74 MMHG | SYSTOLIC BLOOD PRESSURE: 141 MMHG | HEART RATE: 76 BPM

## 2022-07-14 DIAGNOSIS — I63.9 CEREBRAL INFARCTION, UNSPECIFIED: ICD-10-CM

## 2022-07-14 LAB
ALBUMIN SERPL ELPH-MCNC: 3 G/DL — LOW (ref 3.5–5.2)
ALP SERPL-CCNC: 214 U/L — HIGH (ref 30–115)
ALT FLD-CCNC: 19 U/L — SIGNIFICANT CHANGE UP (ref 0–41)
ANION GAP SERPL CALC-SCNC: 10 MMOL/L — SIGNIFICANT CHANGE UP (ref 7–14)
APTT BLD: 40.8 SEC — HIGH (ref 27–39.2)
AST SERPL-CCNC: 21 U/L — SIGNIFICANT CHANGE UP (ref 0–41)
BILIRUB SERPL-MCNC: 0.5 MG/DL — SIGNIFICANT CHANGE UP (ref 0.2–1.2)
BUN SERPL-MCNC: 27 MG/DL — HIGH (ref 10–20)
CALCIUM SERPL-MCNC: 9.2 MG/DL — SIGNIFICANT CHANGE UP (ref 8.5–10.1)
CHLORIDE SERPL-SCNC: 107 MMOL/L — SIGNIFICANT CHANGE UP (ref 98–110)
CO2 SERPL-SCNC: 26 MMOL/L — SIGNIFICANT CHANGE UP (ref 17–32)
CREAT SERPL-MCNC: 1.1 MG/DL — SIGNIFICANT CHANGE UP (ref 0.7–1.5)
EGFR: 48 ML/MIN/1.73M2 — LOW
GLUCOSE SERPL-MCNC: 97 MG/DL — SIGNIFICANT CHANGE UP (ref 70–99)
HCT VFR BLD CALC: 30.8 % — LOW (ref 37–47)
HGB BLD-MCNC: 10 G/DL — LOW (ref 12–16)
INR BLD: 1.74 RATIO — HIGH (ref 0.65–1.3)
MAGNESIUM SERPL-MCNC: 1.7 MG/DL — LOW (ref 1.8–2.4)
MCHC RBC-ENTMCNC: 32.5 G/DL — SIGNIFICANT CHANGE UP (ref 32–37)
MCHC RBC-ENTMCNC: 34.4 PG — HIGH (ref 27–31)
MCV RBC AUTO: 105.8 FL — HIGH (ref 81–99)
NRBC # BLD: 0 /100 WBCS — SIGNIFICANT CHANGE UP (ref 0–0)
PLATELET # BLD AUTO: 156 K/UL — SIGNIFICANT CHANGE UP (ref 130–400)
POTASSIUM SERPL-MCNC: 3.9 MMOL/L — SIGNIFICANT CHANGE UP (ref 3.5–5)
POTASSIUM SERPL-SCNC: 3.9 MMOL/L — SIGNIFICANT CHANGE UP (ref 3.5–5)
PROT SERPL-MCNC: 6.2 G/DL — SIGNIFICANT CHANGE UP (ref 6–8)
PROTHROM AB SERPL-ACNC: 19.9 SEC — HIGH (ref 9.95–12.87)
RBC # BLD: 2.91 M/UL — LOW (ref 4.2–5.4)
RBC # FLD: 15.9 % — HIGH (ref 11.5–14.5)
SODIUM SERPL-SCNC: 143 MMOL/L — SIGNIFICANT CHANGE UP (ref 135–146)
WBC # BLD: 8.47 K/UL — SIGNIFICANT CHANGE UP (ref 4.8–10.8)
WBC # FLD AUTO: 8.47 K/UL — SIGNIFICANT CHANGE UP (ref 4.8–10.8)

## 2022-07-14 PROCEDURE — 99239 HOSP IP/OBS DSCHRG MGMT >30: CPT

## 2022-07-14 RX ORDER — FUROSEMIDE 40 MG
1 TABLET ORAL
Qty: 30 | Refills: 0
Start: 2022-07-14 | End: 2022-08-12

## 2022-07-14 RX ORDER — MAGNESIUM SULFATE 500 MG/ML
2 VIAL (ML) INJECTION ONCE
Refills: 0 | Status: COMPLETED | OUTPATIENT
Start: 2022-07-14 | End: 2022-07-14

## 2022-07-14 RX ORDER — WARFARIN SODIUM 2.5 MG/1
1 TABLET ORAL
Qty: 0 | Refills: 0 | DISCHARGE

## 2022-07-14 RX ORDER — PANTOPRAZOLE SODIUM 20 MG/1
1 TABLET, DELAYED RELEASE ORAL
Qty: 30 | Refills: 0
Start: 2022-07-14 | End: 2022-08-12

## 2022-07-14 RX ORDER — ATORVASTATIN CALCIUM 80 MG/1
1 TABLET, FILM COATED ORAL
Qty: 30 | Refills: 0
Start: 2022-07-14 | End: 2022-08-12

## 2022-07-14 RX ORDER — ATORVASTATIN CALCIUM 80 MG/1
1 TABLET, FILM COATED ORAL
Qty: 0 | Refills: 0 | DISCHARGE

## 2022-07-14 RX ORDER — POTASSIUM CHLORIDE 20 MEQ
20 PACKET (EA) ORAL ONCE
Refills: 0 | Status: COMPLETED | OUTPATIENT
Start: 2022-07-14 | End: 2022-07-14

## 2022-07-14 RX ORDER — PANTOPRAZOLE SODIUM 20 MG/1
1 TABLET, DELAYED RELEASE ORAL
Qty: 0 | Refills: 0 | DISCHARGE

## 2022-07-14 RX ORDER — AMLODIPINE BESYLATE 2.5 MG/1
1 TABLET ORAL
Qty: 30 | Refills: 0
Start: 2022-07-14 | End: 2022-08-12

## 2022-07-14 RX ORDER — WARFARIN SODIUM 2.5 MG/1
1 TABLET ORAL
Qty: 13 | Refills: 0
Start: 2022-07-14 | End: 2022-08-12

## 2022-07-14 RX ORDER — DILTIAZEM HCL 120 MG
1 CAPSULE, EXT RELEASE 24 HR ORAL
Qty: 0 | Refills: 0 | DISCHARGE

## 2022-07-14 RX ADMIN — Medication 25 GRAM(S): at 11:27

## 2022-07-14 RX ADMIN — Medication 1 TABLET(S): at 14:07

## 2022-07-14 RX ADMIN — ZINC SULFATE TAB 220 MG (50 MG ZINC EQUIVALENT) 220 MILLIGRAM(S): 220 (50 ZN) TAB at 11:26

## 2022-07-14 RX ADMIN — Medication 20 MILLIGRAM(S): at 05:05

## 2022-07-14 RX ADMIN — AMLODIPINE BESYLATE 2.5 MILLIGRAM(S): 2.5 TABLET ORAL at 05:05

## 2022-07-14 RX ADMIN — CHLORHEXIDINE GLUCONATE 1 APPLICATION(S): 213 SOLUTION TOPICAL at 05:55

## 2022-07-14 RX ADMIN — Medication 500 MILLIGRAM(S): at 11:26

## 2022-07-14 RX ADMIN — IRON SUCROSE 110 MILLIGRAM(S): 20 INJECTION, SOLUTION INTRAVENOUS at 14:06

## 2022-07-14 RX ADMIN — Medication 20 MILLIEQUIVALENT(S): at 11:26

## 2022-07-15 PROBLEM — I48.20 CHRONIC ATRIAL FIBRILLATION, UNSPECIFIED: Chronic | Status: ACTIVE | Noted: 2022-07-10

## 2022-07-15 PROBLEM — E78.5 HYPERLIPIDEMIA, UNSPECIFIED: Chronic | Status: ACTIVE | Noted: 2022-07-10

## 2022-07-15 PROBLEM — I10 ESSENTIAL (PRIMARY) HYPERTENSION: Chronic | Status: ACTIVE | Noted: 2022-07-10

## 2022-07-15 PROBLEM — I63.9 CEREBRAL INFARCTION, UNSPECIFIED: Chronic | Status: ACTIVE | Noted: 2022-07-10

## 2022-07-18 ENCOUNTER — APPOINTMENT (OUTPATIENT)
Dept: CARE COORDINATION | Facility: HOME HEALTH | Age: 87
End: 2022-07-18

## 2022-07-18 VITALS
RESPIRATION RATE: 16 BRPM | OXYGEN SATURATION: 98 % | HEART RATE: 70 BPM | SYSTOLIC BLOOD PRESSURE: 130 MMHG | DIASTOLIC BLOOD PRESSURE: 70 MMHG

## 2022-07-18 DIAGNOSIS — I50.30 UNSPECIFIED DIASTOLIC (CONGESTIVE) HEART FAILURE: ICD-10-CM

## 2022-07-18 DIAGNOSIS — I10 ESSENTIAL (PRIMARY) HYPERTENSION: ICD-10-CM

## 2022-07-18 DIAGNOSIS — L97.519 NON-PRESSURE CHRONIC ULCER OF OTHER PART OF RIGHT FOOT WITH UNSPECIFIED SEVERITY: ICD-10-CM

## 2022-07-18 DIAGNOSIS — I48.91 UNSPECIFIED ATRIAL FIBRILLATION: ICD-10-CM

## 2022-07-18 PROBLEM — Z00.00 ENCOUNTER FOR PREVENTIVE HEALTH EXAMINATION: Status: ACTIVE | Noted: 2022-07-18

## 2022-07-18 PROCEDURE — 99496 TRANSJ CARE MGMT HIGH F2F 7D: CPT

## 2022-07-18 RX ORDER — AMLODIPINE BESYLATE 2.5 MG/1
2.5 TABLET ORAL
Qty: 30 | Refills: 0 | Status: ACTIVE | COMMUNITY
Start: 2022-07-14

## 2022-07-18 RX ORDER — ATORVASTATIN CALCIUM 40 MG/1
40 TABLET, FILM COATED ORAL
Qty: 90 | Refills: 0 | Status: ACTIVE | COMMUNITY
Start: 2022-06-29

## 2022-07-18 RX ORDER — WARFARIN 2 MG/1
2 TABLET ORAL
Qty: 90 | Refills: 0 | Status: ACTIVE | COMMUNITY
Start: 2022-07-08

## 2022-07-18 RX ORDER — PANTOPRAZOLE 40 MG/1
40 TABLET, DELAYED RELEASE ORAL
Qty: 90 | Refills: 0 | Status: ACTIVE | COMMUNITY
Start: 2022-07-01

## 2022-07-18 RX ORDER — CALCIUM CARBONATE/VITAMIN D3 600MG-5MCG
600-200 TABLET ORAL
Qty: 90 | Refills: 0 | Status: ACTIVE | COMMUNITY
Start: 2022-03-24

## 2022-07-18 RX ORDER — FUROSEMIDE 20 MG/1
20 TABLET ORAL
Qty: 30 | Refills: 0 | Status: ACTIVE | COMMUNITY
Start: 2022-07-14

## 2022-07-18 RX ORDER — AZITHROMYCIN 250 MG/1
250 TABLET, FILM COATED ORAL
Qty: 6 | Refills: 0 | Status: DISCONTINUED | COMMUNITY
Start: 2022-05-04

## 2022-07-18 RX ORDER — DILTIAZEM HYDROCHLORIDE 180 MG/1
180 CAPSULE, EXTENDED RELEASE ORAL
Qty: 90 | Refills: 0 | Status: DISCONTINUED | COMMUNITY
Start: 2022-05-11

## 2022-07-18 NOTE — HISTORY OF PRESENT ILLNESS
[FreeTextEntry1] : follow up CHF  [de-identified] : Patient is a 89 y/o female enrolled in the [facility] TCM program s/p a recent admission for CHF at [facility] 7/10- 715 with a PMH HfpEF  Afib on coumadin ,  HTN, HLD, and  CVA . Patient was diuresed by the inpatient team and dc home with [facility], on arrival patient alert  in NAD denies c/p, sob, and le swelling, HHA 24/7 . 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 Transitioned to oral lasix and weaned off supplemental oxygen. TTE 7/10/22 Showed normal LV function with Mod MR and Mod AS. Patient with low ferritin (87), completed 4 days course of IV iron. \par Patient was having difficulty swallowing so speech and swallow eval was performed and patient failed. Patient S+S eval was repeated the following day and again failed. Goals of care conversation was held with patients niece who lives with her and decision was made for comort feeds and a puree diet but no NGT placement or PEG. \par She was also seen by podiatry for non healing right foot wounds. Wound care was performed by podiatry. Xray was negative for OM.\par On admission, pt was noted to be in persistent Sinus bradycardia on telemetry monitor and therefore Cardizem was discontinued. During hospitalization, patient now in atrial fibrillation, rate controlled <110 bpm without AV jose agent. \par To continue home regime of coumadin 2mg 3x/week.\par For HTN management, amlodipine 2.5mg was started and tolerated. \par Patient is stable to be discharge home with home health visiting Nurse

## 2022-07-18 NOTE — ASSESSMENT
[FreeTextEntry1] : Patient is a 89 y/o female enrolled in the [facility] TCM program s/p a recent admission for CHF at [facility] 7/10- 715 with a PMH HfpEF  Afib on coumadin ,  HTN, HLD, and  CVA . Patient was diuresed by the inpatient team and dc home with [facility], on arrival patient alert  in NAD denies c/p, sob, and le swelling, HHA 24/7 .

## 2022-07-18 NOTE — PHYSICAL EXAM
[No Acute Distress] : no acute distress [Well-Appearing] : well-appearing [No Respiratory Distress] : no respiratory distress  [No Accessory Muscle Use] : no accessory muscle use [Clear to Auscultation] : lungs were clear to auscultation bilaterally [Normal Rhythm/Effort] : normal respiratory rhythm and effort [Rales / Crackles Bilateral] : no rales or crackles were heard [Normal Rate] : normal rate  [Regular Rhythm] : with a regular rhythm [Normal S1, S2] : normal S1 and S2 [No Edema] : there was no peripheral edema [Soft] : abdomen soft [Non Tender] : non-tender [Non-distended] : non-distended [de-identified] : wheelchair bound [de-identified] : healing sacral ucler noted, rt foot ucler healing and dry  [de-identified] : at baseline

## 2022-07-18 NOTE — COUNSELING
[de-identified] : Pt was informed about CN’s role/ [facility] program and overview of transitional care reviewed with patient. In addition, yellow contact card was provided. Pt educated on topics of importance such as compliance with all provider visits, prescribed medication regimen, and low salt diet. Pt encouraged calling CN with any issues, concerns or questions, also educated to notify CN if experiencing CP, SOB , cough, increased mucus/phlegm production, abdominal discomfort/swelling, difficulty sleeping or lying flat, fever, chills, fatigue, weight gain of 2-3lbs in 24 hours or 5lbs in one week,  dizziness, lightheadedness, n/v/d/c, swelling to extremities and/or any signs of CHF exacerbation as reviewed. Reassurance provided. Will continue to monitor\par \par

## 2022-07-18 NOTE — PLAN
[FreeTextEntry1] : CHF- low salt diet, daily weights , yellow zones reviewed , c/w furosemide\par afib - c/w coumadin 3 x a week, rate controlled\par HTN- c/w amlodipine\par foot ulcer- c/w betadine, wound care , podiatry follow up as scheduled\par house call referral placed. DNR/DNI discussed hospice with family

## 2022-07-20 DIAGNOSIS — I48.0 PAROXYSMAL ATRIAL FIBRILLATION: ICD-10-CM

## 2022-07-20 DIAGNOSIS — E78.5 HYPERLIPIDEMIA, UNSPECIFIED: ICD-10-CM

## 2022-07-20 DIAGNOSIS — I08.3 COMBINED RHEUMATIC DISORDERS OF MITRAL, AORTIC AND TRICUSPID VALVES: ICD-10-CM

## 2022-07-20 DIAGNOSIS — F03.90 UNSPECIFIED DEMENTIA WITHOUT BEHAVIORAL DISTURBANCE: ICD-10-CM

## 2022-07-20 DIAGNOSIS — E87.1 HYPO-OSMOLALITY AND HYPONATREMIA: ICD-10-CM

## 2022-07-20 DIAGNOSIS — I11.0 HYPERTENSIVE HEART DISEASE WITH HEART FAILURE: ICD-10-CM

## 2022-07-20 DIAGNOSIS — I69.920 APHASIA FOLLOWING UNSPECIFIED CEREBROVASCULAR DISEASE: ICD-10-CM

## 2022-07-20 DIAGNOSIS — E87.5 HYPERKALEMIA: ICD-10-CM

## 2022-07-20 DIAGNOSIS — L89.619 PRESSURE ULCER OF RIGHT HEEL, UNSPECIFIED STAGE: ICD-10-CM

## 2022-07-20 DIAGNOSIS — R77.8 OTHER SPECIFIED ABNORMALITIES OF PLASMA PROTEINS: ICD-10-CM

## 2022-07-20 DIAGNOSIS — I69.954 HEMIPLEGIA AND HEMIPARESIS FOLLOWING UNSPECIFIED CEREBROVASCULAR DISEASE AFFECTING LEFT NON-DOMINANT SIDE: ICD-10-CM

## 2022-07-20 DIAGNOSIS — R33.9 RETENTION OF URINE, UNSPECIFIED: ICD-10-CM

## 2022-07-20 DIAGNOSIS — I44.0 ATRIOVENTRICULAR BLOCK, FIRST DEGREE: ICD-10-CM

## 2022-07-20 DIAGNOSIS — Z88.0 ALLERGY STATUS TO PENICILLIN: ICD-10-CM

## 2022-07-20 DIAGNOSIS — Z79.01 LONG TERM (CURRENT) USE OF ANTICOAGULANTS: ICD-10-CM

## 2022-07-20 DIAGNOSIS — I50.33 ACUTE ON CHRONIC DIASTOLIC (CONGESTIVE) HEART FAILURE: ICD-10-CM

## 2022-07-20 DIAGNOSIS — Z79.899 OTHER LONG TERM (CURRENT) DRUG THERAPY: ICD-10-CM

## 2022-07-20 DIAGNOSIS — I27.20 PULMONARY HYPERTENSION, UNSPECIFIED: ICD-10-CM

## 2022-07-20 DIAGNOSIS — R00.1 BRADYCARDIA, UNSPECIFIED: ICD-10-CM

## 2022-07-20 DIAGNOSIS — I51.7 CARDIOMEGALY: ICD-10-CM

## 2022-07-20 DIAGNOSIS — L89.899 PRESSURE ULCER OF OTHER SITE, UNSPECIFIED STAGE: ICD-10-CM

## 2022-07-20 DIAGNOSIS — R32 UNSPECIFIED URINARY INCONTINENCE: ICD-10-CM

## 2022-07-20 DIAGNOSIS — Z99.3 DEPENDENCE ON WHEELCHAIR: ICD-10-CM

## 2022-07-20 DIAGNOSIS — Z66 DO NOT RESUSCITATE: ICD-10-CM

## 2025-04-21 NOTE — DISCHARGE NOTE NURSING/CASE MANAGEMENT/SOCIAL WORK - BRAND OF COVID-19 VACCINATION
Amlodipine is a blood pressure medication.  She should not stop this.   This was a diagnosis that was carried in from a different hospital system.   I would look at resolving this.    Iker
